# Patient Record
Sex: MALE
[De-identification: names, ages, dates, MRNs, and addresses within clinical notes are randomized per-mention and may not be internally consistent; named-entity substitution may affect disease eponyms.]

---

## 2018-01-03 ENCOUNTER — HOSPITAL ENCOUNTER (EMERGENCY)
Dept: HOSPITAL 88 - ER | Age: 58
Discharge: HOME | End: 2018-01-03
Payer: COMMERCIAL

## 2018-01-03 VITALS — BODY MASS INDEX: 22.53 KG/M2 | HEIGHT: 64 IN | WEIGHT: 132 LBS

## 2018-01-03 DIAGNOSIS — J45.909: ICD-10-CM

## 2018-01-03 DIAGNOSIS — J20.9: ICD-10-CM

## 2018-01-03 DIAGNOSIS — R05: Primary | ICD-10-CM

## 2018-01-03 LAB
FLUAV + FLUBV AG SPEC IF: NEGATIVE
S PYO AG THROAT QL: NEGATIVE

## 2018-01-03 PROCEDURE — 71020: CPT

## 2018-01-03 PROCEDURE — 83518 IMMUNOASSAY DIPSTICK: CPT

## 2018-01-03 PROCEDURE — 87070 CULTURE OTHR SPECIMN AEROBIC: CPT

## 2018-01-03 PROCEDURE — 99283 EMERGENCY DEPT VISIT LOW MDM: CPT

## 2018-01-03 PROCEDURE — 87400 INFLUENZA A/B EACH AG IA: CPT

## 2018-01-03 PROCEDURE — 93005 ELECTROCARDIOGRAM TRACING: CPT

## 2018-01-03 NOTE — DIAGNOSTIC IMAGING REPORT
EXAMINATION:  CHEST 2 VIEWS    



INDICATION:           Cough. 



COMPARISON:  None

     

FINDINGS:

TUBES and LINES:  None.



LUNGS:  Lungs are well inflated.  Lungs are clear.   There is no evidence of

pneumonia or pulmonary edema.



PLEURA:  No pleural effusion or pneumothorax.



HEART AND MEDIASTINUM:  The cardiomediastinal silhouette is unremarkable.    



BONES AND SOFT TISSUES:  No acute osseous lesion.  Soft tissues are

unremarkable.



UPPER ABDOMEN: No free air under the diaphragm.    



IMPRESSION: 

No acute thoracic abnormality.





Signed by: Dr. Iam Paul M.D. on 1/3/2018 5:44 AM

## 2019-11-07 ENCOUNTER — HOSPITAL ENCOUNTER (EMERGENCY)
Dept: HOSPITAL 88 - ER | Age: 59
Discharge: HOME | End: 2019-11-07
Payer: COMMERCIAL

## 2019-11-07 VITALS — BODY MASS INDEX: 22.53 KG/M2 | HEIGHT: 64 IN | WEIGHT: 132 LBS

## 2019-11-07 DIAGNOSIS — S00.12XA: ICD-10-CM

## 2019-11-07 DIAGNOSIS — I16.0: ICD-10-CM
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
## 2019-11-07 DIAGNOSIS — S05.02XA: Primary | ICD-10-CM
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
## 2019-11-07 PROCEDURE — 99283 EMERGENCY DEPT VISIT LOW MDM: CPT

## 2019-11-07 NOTE — XMS REPORT
Patient Summary Document

                             Created on: 2019



JEYSON MARTINI

External Reference #: [id number]

: 1960

Sex: Male



Demographics







                          Address                   [address] [place], [place] [id number]

 

                          Home Phone                [phone]

 

                          Preferred Language        Unknown

 

                          Marital Status            Unknown

 

                          [place] Affiliation     Unknown

 

                          Race                      Unknown

 

                                        Additional Race(s)  

 

                          Ethnic Group              Unknown





Author







                          Author                    [facility]

 

                          Address                   Unknown

 

                          Phone                     Unavailable







Care Team Providers







                    Care Team Member Name    Role                Phone

 

                    SWEET, A LAIRD      Unavailable         Unavailable







Problems

This patient has no known problems.



Allergies, Adverse Reactions, Alerts

This patient has no known allergies or adverse reactions.



Medications

This patient has no known medications.



Results







           Test Description    Test Time    Test Comments    Text Results    Atomic Results    Result

 Comments

 

                CHEST 2 VIEWS                                        [place] [address]      Patient Name: JEYSON MARTINI   MR 
#: [id number]    : 1960 Age/Sex: 57/M  Acct #: [id number] Req #: [id number]
[id number]  Adm Physician:     Ordered by: ILDEFONSO SNYDER MD  Report #: [id number]  
Location: ER  Room/Bed:     
____________________________________________________________________________
_______________________    Procedure: [id number] DX/CHEST 2 VIEWS  Exam Date: 
18                            Exam Time: 0400       REPORT STATUS: Signed 
  EXAMINATION:  CHEST 2 VIEWS          INDICATION:           Cough.       
COMPARISON:  None           FINDINGS:   TUBES and LINES:  None.      LUNGS:  
Lungs are well inflated.  Lungs are clear.   There is no evidence of   pneumonia
or pulmonary edema.      PLEURA:  No pleural effusion or pneumothorax.      
HEART AND MEDIASTINUM:  The cardiomediastinal silhouette is unremarkable.       
  BONES AND SOFT TISSUES:  No acute osseous lesion.  Soft tissues are   
unremarkable.      UPPER ABDOMEN: No free air under the diaphragm.          
IMPRESSION:    No acute thoracic abnormality.         Signed by: Dr. Iam Paul M.D. on 1/3/2018 5:44 AM        Dictated By: IAM BELTRAN MD  
Electronically Signed By: IAM BELTRAN MD on 18  Transcribed 
By: MAKSIM on 18       COPY TO:   ILDEFONSO SNYDER MD

## 2020-03-10 ENCOUNTER — HOSPITAL ENCOUNTER (EMERGENCY)
Dept: HOSPITAL 88 - ER | Age: 60
Discharge: HOME | End: 2020-03-10
Payer: COMMERCIAL

## 2020-03-10 VITALS — HEIGHT: 64 IN | WEIGHT: 132 LBS | BODY MASS INDEX: 22.53 KG/M2

## 2020-03-10 DIAGNOSIS — R31.29: Primary | ICD-10-CM

## 2020-03-10 DIAGNOSIS — J45.909: ICD-10-CM

## 2020-03-10 DIAGNOSIS — I10: ICD-10-CM

## 2020-03-10 LAB
ALBUMIN SERPL-MCNC: 4.4 G/DL (ref 3.5–5)
ALBUMIN/GLOB SERPL: 1.4 {RATIO} (ref 0.8–2)
ALP SERPL-CCNC: 72 IU/L (ref 40–150)
ALT SERPL-CCNC: 28 IU/L (ref 0–55)
ANION GAP SERPL CALC-SCNC: 8.6 MMOL/L (ref 8–16)
BACTERIA URNS QL MICRO: (no result) /HPF
BASOPHILS # BLD AUTO: 0 10*3/UL (ref 0–0.1)
BASOPHILS NFR BLD AUTO: 0.2 % (ref 0–1)
BILIRUB UR QL: NEGATIVE
BUN SERPL-MCNC: 15 MG/DL (ref 7–26)
BUN/CREAT SERPL: 15 (ref 6–25)
CALCIUM SERPL-MCNC: 9.1 MG/DL (ref 8.4–10.2)
CHLORIDE SERPL-SCNC: 106 MMOL/L (ref 98–107)
CK MB SERPL-MCNC: 1.6 NG/ML (ref 0–5)
CK SERPL-CCNC: 254 IU/L (ref 30–200)
CLARITY UR: (no result)
CO2 SERPL-SCNC: 27 MMOL/L (ref 22–29)
COLOR UR: (no result)
DEPRECATED APTT PLAS QN: 29.4 SECONDS (ref 23.8–35.5)
DEPRECATED INR PLAS: 0.94
DEPRECATED NEUTROPHILS # BLD AUTO: 6.6 10*3/UL (ref 2.1–6.9)
DEPRECATED RBC URNS MANUAL-ACNC: (no result) /HPF (ref 0–5)
EGFRCR SERPLBLD CKD-EPI 2021: > 60 ML/MIN (ref 60–?)
EOSINOPHIL # BLD AUTO: 0 10*3/UL (ref 0–0.4)
EOSINOPHIL NFR BLD AUTO: 0.4 % (ref 0–6)
EPI CELLS URNS QL MICRO: (no result) /LPF
ERYTHROCYTE [DISTWIDTH] IN CORD BLOOD: 12.7 % (ref 11.7–14.4)
GLOBULIN PLAS-MCNC: 3.1 G/DL (ref 2.3–3.5)
GLUCOSE SERPLBLD-MCNC: 115 MG/DL (ref 74–118)
HCT VFR BLD AUTO: 42 % (ref 38.2–49.6)
HGB BLD-MCNC: 14.4 G/DL (ref 14–18)
KETONES UR QL STRIP.AUTO: NEGATIVE
LEUKOCYTE ESTERASE UR QL STRIP.AUTO: NEGATIVE
LYMPHOCYTES # BLD: 1 10*3/UL (ref 1–3.2)
LYMPHOCYTES NFR BLD AUTO: 12.5 % (ref 18–39.1)
MCH RBC QN AUTO: 29.9 PG (ref 28–32)
MCHC RBC AUTO-ENTMCNC: 34.3 G/DL (ref 31–35)
MCV RBC AUTO: 87.1 FL (ref 81–99)
MONOCYTES # BLD AUTO: 0.5 10*3/UL (ref 0.2–0.8)
MONOCYTES NFR BLD AUTO: 5.7 % (ref 4.4–11.3)
NEUTS SEG NFR BLD AUTO: 80.8 % (ref 38.7–80)
NITRITE UR QL STRIP.AUTO: NEGATIVE
PLATELET # BLD AUTO: 120 X10E3/UL (ref 140–360)
POTASSIUM SERPL-SCNC: 3.6 MMOL/L (ref 3.5–5.1)
PROT UR QL STRIP.AUTO: NEGATIVE
PROTHROMBIN TIME: 13.1 SECONDS (ref 11.9–14.5)
RBC # BLD AUTO: 4.82 X10E6/UL (ref 4.3–5.7)
RBC CASTS #/AREA URNS LPF: (no result) /[LPF]
SODIUM SERPL-SCNC: 138 MMOL/L (ref 136–145)
SP GR UR STRIP: 1.02 (ref 1.01–1.02)
UROBILINOGEN UR STRIP-MCNC: 0.2 MG/DL (ref 0.2–1)
WBC #/AREA URNS HPF: (no result) /HPF (ref 0–5)

## 2020-03-10 PROCEDURE — 81001 URINALYSIS AUTO W/SCOPE: CPT

## 2020-03-10 PROCEDURE — 85610 PROTHROMBIN TIME: CPT

## 2020-03-10 PROCEDURE — 82553 CREATINE MB FRACTION: CPT

## 2020-03-10 PROCEDURE — 85730 THROMBOPLASTIN TIME PARTIAL: CPT

## 2020-03-10 PROCEDURE — 80053 COMPREHEN METABOLIC PANEL: CPT

## 2020-03-10 PROCEDURE — 82550 ASSAY OF CK (CPK): CPT

## 2020-03-10 PROCEDURE — 85025 COMPLETE CBC W/AUTO DIFF WBC: CPT

## 2020-03-10 PROCEDURE — 36415 COLL VENOUS BLD VENIPUNCTURE: CPT

## 2020-03-10 PROCEDURE — 84484 ASSAY OF TROPONIN QUANT: CPT

## 2020-03-29 ENCOUNTER — HOSPITAL ENCOUNTER (EMERGENCY)
Dept: HOSPITAL 88 - ER | Age: 60
Discharge: HOME | End: 2020-03-29
Payer: COMMERCIAL

## 2020-03-29 VITALS — HEIGHT: 64 IN | BODY MASS INDEX: 22.53 KG/M2 | WEIGHT: 132 LBS

## 2020-03-29 VITALS — SYSTOLIC BLOOD PRESSURE: 155 MMHG | DIASTOLIC BLOOD PRESSURE: 88 MMHG

## 2020-03-29 DIAGNOSIS — N13.2: Primary | ICD-10-CM

## 2020-03-29 PROCEDURE — 74176 CT ABD & PELVIS W/O CONTRAST: CPT

## 2020-03-29 PROCEDURE — 99283 EMERGENCY DEPT VISIT LOW MDM: CPT

## 2020-03-29 NOTE — DIAGNOSTIC IMAGING REPORT
EXAM: CT Abdomen and Pelvis WITHOUT contrast  

INDICATION: Left flank pain .      

COMPARISON: None.

TECHNIQUE: Abdomen and pelvis were scanned utilizing a multidetector helical

scanner from the lung base to the pubic symphysis without administration of IV

contrast. Absence of intravenous contrast decreases sensitivity for detection

of focal lesions and vascular pathology. Coronal and sagittal reformations were

obtained. Routine protocol was performed. 

     IV CONTRAST: None

     ORAL CONTRAST: None

            

COMPLICATIONS: None



RADIATION DOSE:

     Total DLP: 206 mGy*cm

     Estimated effective dose: (DLP x 0.015 x size factor) mSv

     CTDIvol has been reviewed. It is below the limits set by the Radiation

Protocol Committee (RPC).

     Dose modulation, iterative reconstruction, and/or weight based adjustment

of the mA/kV was utilized to reduce the radiation dose to as low as reasonably

achievable. 



FINDINGS:



LINES and TUBES: None.



LOWER THORAX:  Unremarkable



HEPATOBILIARY:      A few scattered small too small to characterize

hypodensities , likely benign. No biliary ductal dilation. 



GALLBLADDER: No radio-opaque stones or sludge.  No wall thickening.



SPLEEN: No splenomegaly. 



PANCREAS: No focal masses or ductal dilatation.  



ADRENALS: No adrenal nodules    



KIDNEYS/URETERS: A 3 mm obstructive calculus in the distal left ureter with

mild upstream left hydroureteronephrosis. No hydronephrosis. No cystic or solid

mass lesions.  No stones.



GI TRACT: No abnormal distention, wall thickening, or evidence of bowel

obstruction. Small third segment duodenal diverticulum. Appendix is normal.



PELVIC ORGANS/BLADDER:  Moderate prostatomegaly with subtle internal

calcifications.



LYMPH NODES: No lymphadenopathy.



VESSELS: Minimal arterial calcifications.    



PERITONEUM / RETROPERITONEUM: There are 2 tiny calcifications in the lower

peritoneum, likely chronically torsed , avulsed, calcified epiploic appendices.



BONES: Degenerative changes in the spine. 



SOFT TISSUES: There is a fat containing left inguinal hernia.            



IMPRESSION: 



A 3 mm obstructive calculus in the distal left ureter with mild upstream left

hydroureteronephrosis.



Moderate prostatomegaly.



Signed by: Ric Zhao DO on 3/29/2020 9:03 PM

## 2020-09-12 ENCOUNTER — HOSPITAL ENCOUNTER (INPATIENT)
Dept: HOSPITAL 88 - ER | Age: 60
LOS: 5 days | Discharge: HOME | DRG: 872 | End: 2020-09-17
Attending: INTERNAL MEDICINE | Admitting: INTERNAL MEDICINE
Payer: COMMERCIAL

## 2020-09-12 VITALS — HEIGHT: 64 IN | WEIGHT: 132 LBS | BODY MASS INDEX: 22.53 KG/M2

## 2020-09-12 VITALS — DIASTOLIC BLOOD PRESSURE: 85 MMHG | SYSTOLIC BLOOD PRESSURE: 122 MMHG

## 2020-09-12 DIAGNOSIS — R19.7: ICD-10-CM

## 2020-09-12 DIAGNOSIS — Z11.59: ICD-10-CM

## 2020-09-12 DIAGNOSIS — A41.51: Primary | ICD-10-CM

## 2020-09-12 DIAGNOSIS — J45.909: ICD-10-CM

## 2020-09-12 DIAGNOSIS — E87.6: ICD-10-CM

## 2020-09-12 DIAGNOSIS — K25.9: ICD-10-CM

## 2020-09-12 DIAGNOSIS — Z16.24: ICD-10-CM

## 2020-09-12 DIAGNOSIS — K40.90: ICD-10-CM

## 2020-09-12 DIAGNOSIS — Z16.12: ICD-10-CM

## 2020-09-12 DIAGNOSIS — C61: ICD-10-CM

## 2020-09-12 DIAGNOSIS — I10: ICD-10-CM

## 2020-09-12 DIAGNOSIS — N12: ICD-10-CM

## 2020-09-12 LAB
ALBUMIN SERPL-MCNC: 4.3 G/DL (ref 3.5–5)
ALBUMIN/GLOB SERPL: 1.6 {RATIO} (ref 0.8–2)
ALP SERPL-CCNC: 67 IU/L (ref 40–150)
ALT SERPL-CCNC: 27 IU/L (ref 0–55)
ANION GAP SERPL CALC-SCNC: 17.4 MMOL/L (ref 8–16)
BACTERIA URNS QL MICRO: (no result) /HPF
BASOPHILS # BLD AUTO: 0 10*3/UL (ref 0–0.1)
BASOPHILS NFR BLD AUTO: 0 % (ref 0–1)
BILIRUB UR QL: NEGATIVE
BUN SERPL-MCNC: 14 MG/DL (ref 7–26)
BUN/CREAT SERPL: 15 (ref 6–25)
CALCIUM SERPL-MCNC: 8.1 MG/DL (ref 8.4–10.2)
CHLORIDE SERPL-SCNC: 104 MMOL/L (ref 98–107)
CLARITY UR: (no result)
CO2 SERPL-SCNC: 18 MMOL/L (ref 22–29)
COLOR UR: (no result)
DEPRECATED NEUTROPHILS # BLD AUTO: 3.9 10*3/UL (ref 2.1–6.9)
DEPRECATED RBC URNS MANUAL-ACNC: >50 /HPF (ref 0–5)
EGFRCR SERPLBLD CKD-EPI 2021: > 60 ML/MIN (ref 60–?)
EOSINOPHIL # BLD AUTO: 0 10*3/UL (ref 0–0.4)
EOSINOPHIL NFR BLD AUTO: 0.2 % (ref 0–6)
EPI CELLS URNS QL MICRO: (no result) /LPF
ERYTHROCYTE [DISTWIDTH] IN CORD BLOOD: 12.5 % (ref 11.7–14.4)
GLOBULIN PLAS-MCNC: 2.7 G/DL (ref 2.3–3.5)
GLUCOSE SERPLBLD-MCNC: 129 MG/DL (ref 74–118)
HCT VFR BLD AUTO: 41.3 % (ref 38.2–49.6)
HGB BLD-MCNC: 14.3 G/DL (ref 14–18)
KETONES UR QL STRIP.AUTO: NEGATIVE
LEUKOCYTE ESTERASE UR QL STRIP.AUTO: NEGATIVE
LYMPHOCYTES # BLD: 0.4 10*3/UL (ref 1–3.2)
LYMPHOCYTES NFR BLD AUTO: 10.1 % (ref 18–39.1)
MCH RBC QN AUTO: 29.9 PG (ref 28–32)
MCHC RBC AUTO-ENTMCNC: 34.6 G/DL (ref 31–35)
MCV RBC AUTO: 86.4 FL (ref 81–99)
MONOCYTES # BLD AUTO: 0 10*3/UL (ref 0.2–0.8)
MONOCYTES NFR BLD AUTO: 0.5 % (ref 4.4–11.3)
NEUTS SEG NFR BLD AUTO: 89 % (ref 38.7–80)
NITRITE UR QL STRIP.AUTO: NEGATIVE
PLATELET # BLD AUTO: 96 X10E3/UL (ref 140–360)
POTASSIUM SERPL-SCNC: 3.4 MMOL/L (ref 3.5–5.1)
PROT UR QL STRIP.AUTO: (no result)
RBC # BLD AUTO: 4.78 X10E6/UL (ref 4.3–5.7)
SODIUM SERPL-SCNC: 136 MMOL/L (ref 136–145)
SP GR UR STRIP: 1.01 (ref 1.01–1.02)
UROBILINOGEN UR STRIP-MCNC: 0.2 MG/DL (ref 0.2–1)
WBC #/AREA URNS HPF: (no result) /HPF (ref 0–5)

## 2020-09-12 PROCEDURE — 71045 X-RAY EXAM CHEST 1 VIEW: CPT

## 2020-09-12 PROCEDURE — 87205 SMEAR GRAM STAIN: CPT

## 2020-09-12 PROCEDURE — 82550 ASSAY OF CK (CPK): CPT

## 2020-09-12 PROCEDURE — 02HV33Z INSERTION OF INFUSION DEVICE INTO SUPERIOR VENA CAVA, PERCUTANEOUS APPROACH: ICD-10-PCS

## 2020-09-12 PROCEDURE — 83605 ASSAY OF LACTIC ACID: CPT

## 2020-09-12 PROCEDURE — 85025 COMPLETE CBC W/AUTO DIFF WBC: CPT

## 2020-09-12 PROCEDURE — 36569 INSJ PICC 5 YR+ W/O IMAGING: CPT

## 2020-09-12 PROCEDURE — 87040 BLOOD CULTURE FOR BACTERIA: CPT

## 2020-09-12 PROCEDURE — 87493 C DIFF AMPLIFIED PROBE: CPT

## 2020-09-12 PROCEDURE — 80048 BASIC METABOLIC PNL TOTAL CA: CPT

## 2020-09-12 PROCEDURE — 80053 COMPREHEN METABOLIC PANEL: CPT

## 2020-09-12 PROCEDURE — 82270 OCCULT BLOOD FECES: CPT

## 2020-09-12 PROCEDURE — 36415 COLL VENOUS BLD VENIPUNCTURE: CPT

## 2020-09-12 PROCEDURE — 99284 EMERGENCY DEPT VISIT MOD MDM: CPT

## 2020-09-12 PROCEDURE — 93005 ELECTROCARDIOGRAM TRACING: CPT

## 2020-09-12 PROCEDURE — 82553 CREATINE MB FRACTION: CPT

## 2020-09-12 PROCEDURE — 84484 ASSAY OF TROPONIN QUANT: CPT

## 2020-09-12 PROCEDURE — 87071 CULTURE AEROBIC QUANT OTHER: CPT

## 2020-09-12 PROCEDURE — 87186 SC STD MICRODIL/AGAR DIL: CPT

## 2020-09-12 PROCEDURE — 81001 URINALYSIS AUTO W/SCOPE: CPT

## 2020-09-12 RX ADMIN — SODIUM CHLORIDE SCH MLS/HR: 9 INJECTION, SOLUTION INTRAVENOUS at 22:53

## 2020-09-12 NOTE — DIAGNOSTIC IMAGING REPORT
EXAMINATION:  CHEST SINGLE (PORTABLE)    



INDICATION:      Fever



COMPARISON:  None

     

FINDINGS:    



TUBES and LINES:  None.



LUNGS:  Normal lung volumes.  Lungs are clear.  No consolidations.



PLEURA:  No pleural effusion or pneumothorax.



HEART AND MEDIASTINUM:  The cardiomediastinal silhouette is unremarkable.    



BONES AND SOFT TISSUES:  No acute osseous lesion.  Soft tissues are

unremarkable.



UPPER ABDOMEN: No free air under the diaphragm.    



IMPRESSION: 



No acute thoracic radiographic abnormality.





Signed by: Ric Zhao DO on 9/12/2020 8:19 PM

## 2020-09-12 NOTE — XMS REPORT
Continuity of Care Document

                             Created on: 2020



JEYSON MARTINI

External Reference #: [id number]

: 1960

Sex: Male



Demographics





                          Address                   [address] [place] [address]  [address]

 

                          Home Phone                [phone]

 

                          Preferred Language        Unknown

 

                          Marital Status            Unknown

 

                          Sikhism Affiliation     Unknown

 

                          Race                      Unknown

 

                          Additional Race(s)        Other



 

                          Ethnic Group              Unknown





Author





                          Author                    [facility]

t

 

                          Organization              [facility]

 

                          Address                   [address]

[place]  [address]



 

                          Phone                     Unavailable







Care Team Providers





                    Care Team Member Name Role                Phone

 

                    RASHIDA ARNETT    PCP                 [phone]

 

                    PAULETTE ACEVES      Attphys             Unavailable

 

                    SWEET, RUDDY FREGOSO      Attphys             Unavailable







Payers





           Payer Name Policy Type Policy Number Effective Date Expiration Date S

sonya

 

           [facility]            [id number] 2014 00:00:00     

       [facility]







Problems

This patient has no known problems.



Allergies, Adverse Reactions, Alerts

This patient has no known allergies or adverse reactions.



Medications

This patient has no known medications.



Procedures





                Procedure       Date / Time Performed Performing Clinician [facility]

e

 

                CT of abdomen and pelvis without contrast 2020 00:00:00 PAULETTE FUENTES   [facility]

 

                EMERGENCY DEPT VISIT 2020-03-10 00:00:00                 [facility]







Encounters





             Start Date/Time End Date/Time Encounter Type Admission Type Attendi

[facility]   Care Department Encounter ID    Source

 

           2020 18:55:00 2020 22:26:00 Departed Emergency Room 1    

      KETAN PAULETTE 

[facility]              [id number]        [facility]

 

          2020-03-10 17:30:00 2020-03-10 19:30:00 Departed Emergency Room       

              [facility]                    [id number]              [facility]

 

          2019 20:18:00 2019 21:32:00 Departed Emergency Room       

              [facility]                    [id number]              [facility]







Results





           Test Description Test Time  Test Comments Results    Result Comments 

Source

 

                CT ABDOMEN/PELVIS WO 2020 20:45:00                        

                              

                                                [facility]                        [address] [address]      Patient Name: JEYSON MARTINI                                
  MR #: [id number]                     : 1960                          
        Age/Sex: 59/M  Acct #: [id number]                              Req #: 
[id number]  Adm Physician:                                                      
Ordered by: PAULETTE ACEVES DO                            Report #: [id number]     
  Location: ER                                      Room/Bed:                   
 
________________________________________________________________________________

___________________    Procedure: [id number] CT/CT ABDOMEN/PELVIS WO  Exam Date: 
20                            Exam Time:                              
                REPORT STATUS: Signed    EXAM: CT Abdomen and Pelvis WITHOUT 
contrast     INDICATION: Left flank pain .         COMPARISON: None.   
TECHNIQUE: Abdomen and pelvis were scanned utilizing a multidetector helical   
scanner from the lung base to the pubic symphysis without administration of IV  
contrast. Absence of intravenous contrast decreases sensitivity for detection   
of focal lesions and vascular pathology. Coronal and sagittal reformations were 
 obtained. Routine protocol was performed.         IV CONTRAST: None        ORAL
CONTRAST: None                  COMPLICATIONS: None      RADIATION DOSE:        
Total DLP: 206 mGy*cm        Estimated effective dose: (DLP x 0.015 x size 
factor) mSv        CTDIvol has been reviewed. It is below the limits set by the 
Radiation   Protocol Committee (RPC).        Dose modulation, iterative 
reconstruction, and/or weight based adjustment   of the mA/kV was utilized to 
reduce the radiation dose to as low as reasonably   achievable.       FINDINGS: 
    LINES and TUBES: None.      LOWER THORAX:  Unremarkable      HEPATOBILIARY: 
    A few scattered small too small to characterize   hypodensities , likely 
benign. No biliary ductal dilation.       GALLBLADDER: No radio-opaque stones or
sludge.  No wall thickening.      SPLEEN: No splenomegaly.       PANCREAS: No 
focal masses or ductal dilatation.        ADRENALS: No adrenal nodules          
KIDNEYS/URETERS: A 3 mm obstructive calculus in the distal left ureter with   
mild upstream left hydroureteronephrosis. No hydronephrosis. No cystic or solid 
 mass lesions.  No stones.      GI TRACT: No abnormal distention, wall 
thickening, or evidence of bowel   obstruction. Small third segment duodenal 
diverticulum. Appendix is normal.      PELVIC ORGANS/BLADDER:  Moderate 
prostatomegaly with subtle internal   calcifications.      LYMPH NODES: No 
lymphadenopathy.      VESSELS: Minimal arterial calcifications.          
PERITONEUM / RETROPERITONEUM: There are 2 tiny calcifications in the lower   
peritoneum, likely chronically torsed , avulsed, calcified epiploic appendices. 
    BONES: Degenerative changes in the spine.       SOFT TISSUES: There is a fat
containing left inguinal hernia.                  IMPRESSION:       A 3 mm 
obstructive calculus in the distal left ureter with mild upstream left   
hydroureteronephrosis.      Moderate prostatomegaly.      Signed by: Ric Zhao DO on 3/29/2020 9:03 PM        Dictated By: RIC ZHAO DO  
Electronically Signed By: RIC ZHAO DO on 20  Transcribed By: 
MAKSIM on 20       COPY TO:   PAULETTE ACEVES DO                       

              

 

                    Urine Color         2020-03-10 19:37:00   

 

                                        Test Item

 

             Urine Color (test code = 5778-6) RED          YELLOW       H       

      





[facility] Texas[facility] [id number] 19:37:00* 



             Test Item    Value        Reference Range Interpretation Comments

 

             Urine Clarity (test code = 94377-6) SL CLOUDY    CLEAR        H    

         





[facility] Specific Gravity2020-03-10 19:37:00
  * 



             Test Item    Value        Reference Range Interpretation Comments

 

             Urine Specific Gravity (test code = 5811-5) 1.020        1.010-1.02

5                





[facility] pH2020-03-10 19:37:00* 



             Test Item    Value        Reference Range Interpretation Comments

 

             Urine pH (test code = 48077-2) 6            5-7                    

    





[facility] Leukocyte Esterase2020-03-10 
19:37:00* 



             Test Item    Value        Reference Range Interpretation Comments

 

             Urine Leukocyte Esterase (test code = 5799-2) NEGATIVE     NEGATIVE

                   





[facility] Nitrite2020-03-10 19:37:00* 



             Test Item    Value        Reference Range Interpretation Comments

 

             Urine Nitrite (test code = 29590-4) NEGATIVE     NEGATIVE          

         





[facility] Protein2020-03-10 19:37:00* 



             Test Item    Value        Reference Range Interpretation Comments

 

             Urine Protein (test code = 5804-0) NEGATIVE     NEGATIVE           

        





[facility] Glucose (UA)2020-03-10 19:37:00* 



             Test Item    Value        Reference Range Interpretation Comments

 

             Urine Glucose (UA) (test code = 2349-9) NEGATIVE     NEGATIVE      

             





[facility] Ketones2020-03-10 19:37:00* 



             Test Item    Value        Reference Range Interpretation Comments

 

             Urine Ketones (test code = 79767-2) NEGATIVE     NEGATIVE          

         





[facility] Urobilinogen2020-03-10 19:37:00* 



             Test Item    Value        Reference Range Interpretation Comments

 

             Urine Urobilinogen (test code = 54734-2) 0.2          0.2-1        

              





[facility] Bilirubin2020-03-10 19:37:00* 



             Test Item    Value        Reference Range Interpretation Comments

 

             Urine Bilirubin (test code = 1978-6) NEGATIVE     NEGATIVE         

          





[facility] Blood2020-03-10 19:37:00* 



             Test Item    Value        Reference Range Interpretation Comments

 

             Urine Blood (test code = 83130-0) MODERATE     NEGATIVE            

       





[facility] WBC2020-03-10 19:37:00* 



             Test Item    Value        Reference Range Interpretation Comments

 

             Urine WBC (test code = 5821-4) NONE         0-5                    

    





[facility] RBC2020-03-10 19:37:00* 



             Test Item    Value        Reference Range Interpretation Comments

 

             Urine RBC (test code = 52848-4) 21-50        0-5          H        

     





[facility] Bacteria2020-03-10 19:37:00* 



             Test Item    Value        Reference Range Interpretation Comments

 

             Urine Bacteria (test code = 71700-7) NONE         NONE             

          





[facility] Epithelial Cells2020-03-10 19:37:00
  * 



             Test Item    Value        Reference Range Interpretation Comments

 

             Urine Epithelial Cells (test code = 00139-6) RARE         NONE     

                  





[facility] Red Blood Cell Casts2020-03-10 
19:37:00* 



             Test Item    Value        Reference Range Interpretation Comments

 

             Urine Red Blood Cell Casts (test code = 43381-9) 1-5          >0   

        H             





[facility] Color2020-03-10 19:37:00* 



             Test Item    Value        Reference Range Interpretation Comments

 

             Urine Color (test code = 5778-6) RED          YELLOW       H       

      





[facility] Texas[facility] [id number] 19:37:00* 



             Test Item    Value        Reference Range Interpretation Comments

 

             Urine Clarity (test code = 57026-5) SL CLOUDY    CLEAR        H    

         





[facility] Specific Gravity2020-03-10 19:37:00
  * 



             Test Item    Value        Reference Range Interpretation Comments

 

             Urine Specific Gravity (test code = 5811-5) 1.020        1.010-1.02

5                





[facility] pH2020-03-10 19:37:00* 



             Test Item    Value        Reference Range Interpretation Comments

 

             Urine pH (test code = 93607-9) 6            5-7                    

    





[facility] Leukocyte Esterase2020-03-10 
19:37:00* 



             Test Item    Value        Reference Range Interpretation Comments

 

             Urine Leukocyte Esterase (test code = 5799-2) NEGATIVE     NEGATIVE

                   





[facility] Nitrite2020-03-10 19:37:00* 



             Test Item    Value        Reference Range Interpretation Comments

 

             Urine Nitrite (test code = 71428-9) NEGATIVE     NEGATIVE          

         





[facility] Protein2020-03-10 19:37:00* 



             Test Item    Value        Reference Range Interpretation Comments

 

             Urine Protein (test code = 5804-0) NEGATIVE     NEGATIVE           

        





[facility] Glucose (UA)2020-03-10 19:37:00* 



             Test Item    Value        Reference Range Interpretation Comments

 

             Urine Glucose (UA) (test code = 2349-9) NEGATIVE     NEGATIVE      

             





[facility] Ketones2020-03-10 19:37:00* 



             Test Item    Value        Reference Range Interpretation Comments

 

             Urine Ketones (test code = 35350-9) NEGATIVE     NEGATIVE          

         





[facility] Urobilinogen2020-03-10 19:37:00* 



             Test Item    Value        Reference Range Interpretation Comments

 

             Urine Urobilinogen (test code = 56683-3) 0.2          0.2-1        

              





[facility] Bilirubin2020-03-10 19:37:00* 



             Test Item    Value        Reference Range Interpretation Comments

 

             Urine Bilirubin (test code = 1978-6) NEGATIVE     NEGATIVE         

          





[facility] Blood2020-03-10 19:37:00* 



             Test Item    Value        Reference Range Interpretation Comments

 

             Urine Blood (test code = 55060-4) MODERATE     NEGATIVE            

       





[facility] WBC2020-03-10 19:37:00* 



             Test Item    Value        Reference Range Interpretation Comments

 

             Urine WBC (test code = 5821-4) NONE         0-5                    

    





[facility] RBC2020-03-10 19:37:00* 



             Test Item    Value        Reference Range Interpretation Comments

 

             Urine RBC (test code = 41014-9) 21-50        0-5          H        

     





[facility] Bacteria2020-03-10 19:37:00* 



             Test Item    Value        Reference Range Interpretation Comments

 

             Urine Bacteria (test code = 46216-1) NONE         NONE             

          





[facility] Epithelial Cells2020-03-10 19:37:00
  * 



             Test Item    Value        Reference Range Interpretation Comments

 

             Urine Epithelial Cells (test code = 85658-1) RARE         NONE     

                  





[facility] Red Blood Cell Casts2020-03-10 
19:37:00* 



             Test Item    Value        Reference Range Interpretation Comments

 

             Urine Red Blood Cell Casts (test code = 66649-1) 1-5          >0   

        H             





[facility] 



             Test Item    Value        Reference Range Interpretation Comments

 

             Sodium Level (test code = 2951-2) 138          136-145             

       





[facility] Level2020-03-10 19:34:00* 



             Test Item    Value        Reference Range Interpretation Comments

 

             Potassium Level (test code = 2823-3) 3.6          3.5-5.1          

          





[facility] Texas[facility]loride Level2020-03-10 19:34:00* 



             Test Item    Value        Reference Range Interpretation Comments

 

             Chloride Level (test code = 2075-0) 106                      

         





[facility] Texas[facility]bon Dioxide Level2020-03-10 19:34:00* 



             Test Item    Value        Reference Range Interpretation Comments

 

             Carbon Dioxide Level (test code = 2028-9) 27           22-29       

               





[facility] Texas[facility]ion Gap2020-03-10 19:34:00* 



             Test Item    Value        Reference Range Interpretation Comments

 

             Anion Gap (test code = 33037-3) 8.6          8-16                  

     





[facility] Texas[facility] Urea Nitrogen2020-03-10 19:34:00* 



             Test Item    Value        Reference Range Interpretation Comments

 

             Blood Urea Nitrogen (test code = 3094-0) 15           7-26         

              





[facility] Texas[facility]inine2020-03-10 19:34:00* 



             Test Item    Value        Reference Range Interpretation Comments

 

             Creatinine (test code = 2160-0) 1.02         0.72-1.25             

     





[facility] TexasBUN/Creatinine Ratio2020-03-10 19:34:00* 



             Test Item    Value        Reference Range Interpretation Comments

 

             BUN/Creatinine Ratio (test code = 3097-3) 15           -        

               





[facility] TexasEstimat Glomerular Filtration Rate
2020-03-10 19:34:00* 



             Test Item    Value        Reference Range Interpretation Comments

 

             Estimat Glomerular Filtration Rate (test code = 298742719) > 60    

     >60                        





Ranges were taken from the National Kidney Disease Education Program and the [facility] [employer] literature.Reference ranges:60 or greater: Nuvuxi29-08 (
for 3 consecutive months): Chronic kidney disease 15 or less: Kidney failureD[facility] at [facility] University of TexasGlucose Level2020-03-10 19:34:00* 



             Test Item    Value        Reference Range Interpretation Comments

 

             Glucose Level (test code = PFF3732) 115                      

         





[facility] TexasCalcium Level2020-03-10 19:34:00* 



             Test Item    Value        Reference Range Interpretation Comments

 

             Calcium Level (test code = 13519-7) 9.1          8.4-10.2          

         





[facility] TexasTotal Bilirubin2020-03-10 19:34:00* 



             Test Item    Value        Reference Range Interpretation Comments

 

             Total Bilirubin (test code = 1975-2) 0.6          0.2-1.2          

          





[facility] TexasAspartate Amino Transf (AST/SGOT)
2020-03-10 19:34:00* 



             Test Item    Value        Reference Range Interpretation Comments

 

                                        Aspartate Amino Transf (AST/SGOT) (test 

code = Aspartate Amino Transf 

(AST/SGOT))     22              5-34                             





[facility] TexasAlanine Aminotransferase (ALT/SGPT)
2020-03-10 19:34:00* 



             Test Item    Value        Reference Range Interpretation Comments

 

             Alanine Aminotransferase (ALT/SGPT) (test code = 1742-6) 28        

   0-55                       





[facility] TexasTotal Protein2020-03-10 19:34:00* 



             Test Item    Value        Reference Range Interpretation Comments

 

             Total Protein (test code = 2885-2) 7.5          6.5-8.1            

        





[facility] TexasAlbumin2020-03-10 19:34:00* 



             Test Item    Value        Reference Range Interpretation Comments

 

             Albumin (test code = 1751-7) 4.4          3.5-5.0                  

  





[facility] TexasGlobulin2020-03-10 19:34:00* 



             Test Item    Value        Reference Range Interpretation Comments

 

             Globulin (test code = 81477-3) 3.1          2.3-3.5                

    





[facility] TexasAlbumin/Globulin Ratio2020-03-10 19:34:00
  * 



             Test Item    Value        Reference Range Interpretation Comments

 

             Albumin/Globulin Ratio (test code = 1759-0) 1.4          0.8-2.0   

                 





[facility] TexasAlkaline Phosphatase2020-03-10 19:34:00* 



             Test Item    Value        Reference Range Interpretation Comments

 

             Alkaline Phosphatase (test code = 6768-6) 72                 

               





[facility] TexasCreatine [id number] 19:34:00* 



             Test Item    Value        Reference Range Interpretation Comments

 

             Creatine Kinase (test code = 2157-6) 254                 H   

          





[facility] TexasCreatine Kinase [id number] 19:34:00* 



             Test Item    Value        Reference Range Interpretation Comments

 

             Creatine Kinase MB (test code = 27644-4) 1.60         0-5.0        

              





[facility] TexasTroponin -03-10 19:34:00* 



             Test Item    Value        Reference Range Interpretation Comments

 

             Troponin I (test code = EDA1502) < 0.001      0-0.300              

      





[facility] 19:34:00* 



             Test Item    Value        Reference Range Interpretation Comments

 

             Sodium Level (test code = 2951-2) 138          136-145             

       





[facility] TexasPotassium Level2020-03-10 19:34:00* 



             Test Item    Value        Reference Range Interpretation Comments

 

             Potassium Level (test code = 2823-3) 3.6          3.5-5.1          

          





[facility] TexasChloride Level2020-03-10 19:34:00* 



             Test Item    Value        Reference Range Interpretation Comments

 

             Chloride Level (test code = 2075-0) 106                      

         





[facility] TexasCarbon Dioxide Level2020-03-10 19:34:00* 



             Test Item    Value        Reference Range Interpretation Comments

 

             Carbon Dioxide Level (test code = 2028-9) 27           22-29       

               





[facility] TexasAnion Gap2020-03-10 19:34:00* 



             Test Item    Value        Reference Range Interpretation Comments

 

             Anion Gap (test code = 33037-3) 8.6          8-16                  

     





[facility] TexasBlood Urea Nitrogen2020-03-10 19:34:00* 



             Test Item    Value        Reference Range Interpretation Comments

 

             Blood Urea Nitrogen (test code = 3094-0) 15           7-26         

              





[facility] TexasCreatinine2020-03-10 19:34:00* 



             Test Item    Value        Reference Range Interpretation Comments

 

             Creatinine (test code = 2160-0) 1.02         0.72-1.25             

     





[facility] TexasBUN/Creatinine Ratio2020-03-10 19:34:00* 



             Test Item    Value        Reference Range Interpretation Comments

 

             BUN/Creatinine Ratio (test code = 3097-3) 15           6-        

               





[facility] [facility] TexasEstimat Glomerular Filtration Rate
2020-03-10 19:34:00* 



             Test Item    Value        Reference Range Interpretation Comments

 

             Estimat Glomerular Filtration Rate (test code = 015912671) > 60    

     >60                        





Ranges were taken from the National Kidney Disease Education Program and the [facility] literature.Reference ranges:60 or greater: [id number] (
for 3 consecutive months): Chronic kidney disease 15 or less: Kidney failureDell Seton [facility] of TexasGlucose Level2020-03-10 19:34:00* 



             Test Item    Value        Reference Range Interpretation Comments

 

             Glucose Level (test code = BWW9183) 115                      

         





[facility] TexasCalcium Level2020-03-10 19:34:00* 



             Test Item    Value        Reference Range Interpretation Comments

 

             Calcium Level (test code = 52767-1) 9.1          8.4-10.2          

         





[facility] TexasTotal Bilirubin2020-03-10 19:34:00* 



             Test Item    Value        Reference Range Interpretation Comments

 

             Total Bilirubin (test code = 1975-2) 0.6          0.2-1.2          

          





[facility] TexasAspartate Amino Transf (AST/SGOT)
2020-03-10 19:34:00* 



             Test Item    Value        Reference Range Interpretation Comments

 

                                        Aspartate Amino Transf (AST/SGOT) (test 

code = Aspartate Amino Transf 

(AST/SGOT))     22              5-34                             





[facility] TexasAlanine Aminotransferase (ALT/SGPT)
2020-03-10 19:34:00* 



             Test Item    Value        Reference Range Interpretation Comments

 

             Alanine Aminotransferase (ALT/SGPT) (test code = 1742-6) 28        

   0-55                       





[facility] TexasTotal Protein2020-03-10 19:34:00* 



             Test Item    Value        Reference Range Interpretation Comments

 

             Total Protein (test code = 2885-2) 7.5          6.5-8.1            

        





[facility] TexasAlbumin2020-03-10 19:34:00* 



             Test Item    Value        Reference Range Interpretation Comments

 

             Albumin (test code = 1751-7) 4.4          3.5-5.0                  

  





[facility] TexasGlobulin2020-03-10 19:34:00* 



             Test Item    Value        Reference Range Interpretation Comments

 

             Globulin (test code = 27898-8) 3.1          2.3-3.5                

    





[facility] TexasAlbumin/Globulin Ratio2020-03-10 19:34:00
  * 



             Test Item    Value        Reference Range Interpretation Comments

 

             Albumin/Globulin Ratio (test code = 1759-0) 1.4          0.8-2.0   

                 





[facility] TexasAlkaline Phosphatase2020-03-10 19:34:00* 



             Test Item    Value        Reference Range Interpretation Comments

 

             Alkaline Phosphatase (test code = 6768-6) 72                 

               





[facility] TexasCreatine [id number] 19:34:00* 



             Test Item    Value        Reference Range Interpretation Comments

 

             Creatine Kinase (test code = 2157-6) 254                 H   

          





[facility] TexasCreatine Kinase [id number] 19:34:00* 



             Test Item    Value        Reference Range Interpretation Comments

 

             Creatine Kinase MB (test code = 64177-3) 1.60         0-5.0        

              





[facility] TexasTroponin -03-10 19:34:00* 



             Test Item    Value        Reference Range Interpretation Comments

 

             Troponin I (test code = AMN9778) < 0.001      0-0.300              

      





[facility] TexasProthrombin Time2020-03-10 19:23:00* 



             Test Item    Value        Reference Range Interpretation Comments

 

             Prothrombin Time (test code = 5902-2) 13.1         11.9-14.5       

           





[facility] TexasProthromb Time International Ratio
2020-03-10 19:23:00* 



             Test Item    Value        Reference Range Interpretation Comments

 

             Prothromb Time International Ratio (test code = 6301-6) 0.94       

                             





Oral Anticoagulant Therapy INR Values:1. Low Intensity Therapy        1.5 - 2.02
. Moderate Intensity Therapy   2.0 - 3.03. High Intensity Therapy(1)    2.5 - 3.
54. High Intensity Therapy(2)    3.0 - 4.05. Panic Value INR              > 5.0
[facility] TexasActivated Partial Thromboplast Time
2020-03-10 19:23:00* 



             Test Item    Value        Reference Range Interpretation Comments

 

             Activated Partial Thromboplast Time (test code = 11860-7) 29.4     

    23.8-35.5                  





[facility] TexasProthrombin Time2020-03-10 19:23:00* 



             Test Item    Value        Reference Range Interpretation Comments

 

             Prothrombin Time (test code = 5902-2) 13.1         11.9-14.5       

           





[facility] TexasProthromb Time International Ratio
2020-03-10 19:23:00* 



             Test Item    Value        Reference Range Interpretation Comments

 

             Prothromb Time International Ratio (test code = 6301-6) 0.94       

                             





Oral Anticoagulant Therapy INR Values:1. Low Intensity Therapy        1.5 - 2.02
. Moderate Intensity Therapy   2.0 - 3.03. High Intensity Therapy(1)    2.5 - 3.
54. High Intensity Therapy(2)    3.0 - 4.05. Panic Value INR              > 5.0
[facility] TexasActivated Partial Thromboplast Time
2020-03-10 19:23:00* 



             Test Item    Value        Reference Range Interpretation Comments

 

             Activated Partial Thromboplast Time (test code = 75235-5) 29.4     

    23.8-35.5                  





[facility] TexasWhite Blood Count2020-03-10 19:09:00* 



             Test Item    Value        Reference Range Interpretation Comments

 

             White Blood Count (test code = 6690-2) 8.13         4.8-10.8       

            





[facility] TexasRed Blood Count2020-03-10 19:09:00* 



             Test Item    Value        Reference Range Interpretation Comments

 

             Red Blood Count (test code = 789-8) 4.82         4.3-5.7           

         





[facility] TexasHemoglobin2020-03-10 19:09:00* 



             Test Item    Value        Reference Range Interpretation Comments

 

             Hemoglobin (test code = 83603-0) 14.4         14.0-18.0            

      





[facility] TexasHematocrit2020-03-10 19:09:00* 



             Test Item    Value        Reference Range Interpretation Comments

 

             Hematocrit (test code = 4544-3) 42.0         38.2-49.6             

     





[facility] TexasMean Corpuscular Volume2020-03-10 
19:09:00* 



             Test Item    Value        Reference Range Interpretation Comments

 

             Mean Corpuscular Volume (test code = 787-2) 87.1         81-99     

                 





[facility] [place]Mean Corpuscular Hemoglobin2020-03-10 
19:09:00* 



             Test Item    Value        Reference Range Interpretation Comments

 

             Mean Corpuscular Hemoglobin (test code = 785-6) 29.9         28-32 

                     





[facility] TexasMean Corpuscular Hemoglobin Concent
2020-03-10 19:09:00* 



             Test Item    Value        Reference Range Interpretation Comments

 

             Mean Corpuscular Hemoglobin Concent (test code = 786-4) 34.3       

  31-35                      





[facility] TexasRed Cell Distribution Width2020-03-10 
19:09:00* 



             Test Item    Value        Reference Range Interpretation Comments

 

             Red Cell Distribution Width (test code = 28396-7) 12.7         11.7

-14.4                  





[facility] TexasPlatelet Count2020-03-10 19:09:00* 



             Test Item    Value        Reference Range Interpretation Comments

 

             Platelet Count (test code = 777-3) 120          140-360      L     

        





[facility] TexasNeutrophils (%) (Auto)2020-03-10 19:09:00
  * 



             Test Item    Value        Reference Range Interpretation Comments

 

             Neutrophils (%) (Auto) (test code = 40596-3) 80.8         38.7-80.0

    H             





[facility] TexasLymphocytes (%) (Auto)2020-03-10 19:09:00
  * 



             Test Item    Value        Reference Range Interpretation Comments

 

             Lymphocytes (%) (Auto) (test code = 736-9) 12.5         18.0-39.1  

  L             





[facility] TexasMonocytes (%) (Auto)2020-03-10 19:09:00* 



             Test Item    Value        Reference Range Interpretation Comments

 

             Monocytes (%) (Auto) (test code = 5905-5) 5.7          4.4-11.3    

               





[facility] TexasEosinophils (%) (Auto)2020-03-10 19:09:00
  * 



             Test Item    Value        Reference Range Interpretation Comments

 

             Eosinophils (%) (Auto) (test code = 713-8) 0.4          0.0-6.0    

                





[facility] [place]Basophils (%) (Auto)2020-03-10 19:09:00* 



             Test Item    Value        Reference Range Interpretation Comments

 

             Basophils (%) (Auto) (test code = 706-2) 0.2          0.0-1.0      

              





[facility] TexasIM GRANULOCYTES %2020-03-10 19:09:00* 



             Test Item    Value        Reference Range Interpretation Comments

 

             IM GRANULOCYTES % (test code = IM GRANULOCYTES %) 0.4          0.0-

1.0                    





[facility] TexasNeutrophils # (Auto)2020-03-10 19:09:00* 



             Test Item    Value        Reference Range Interpretation Comments

 

             Neutrophils # (Auto) (test code = 751-8) 6.6          2.1-6.9      

              





[facility] TexasLymphocytes # (Auto)2020-03-10 19:09:00* 



             Test Item    Value        Reference Range Interpretation Comments

 

             Lymphocytes # (Auto) (test code = 46885-6) 1.0          1.0-3.2    

                





[facility] [place]Monocytes # (Auto)2020-03-10 19:09:00* 



             Test Item    Value        Reference Range Interpretation Comments

 

             Monocytes # (Auto) (test code = 742-7) 0.5          0.2-0.8        

            





[facility] TexasEosinophils # (Auto)2020-03-10 19:09:00* 



             Test Item    Value        Reference Range Interpretation Comments

 

             Eosinophils # (Auto) (test code = 711-2) 0.0          0.0-0.4      

              





[facility] TexasBasophils # (Auto)2020-03-10 19:09:00* 



             Test Item    Value        Reference Range Interpretation Comments

 

             Basophils # (Auto) (test code = 704-7) 0.0          0.0-0.1        

            





[facility] TexasAbsolute Immature Granulocyte (auto
2020-03-10 19:09:00* 



             Test Item    Value        Reference Range Interpretation Comments

 

                                        Absolute Immature Granulocyte (auto (nessa

t code = Absolute Immature Granulocyte 

(auto)          0.03            0-0.1                            





[facility] TexasWhite Blood Count2020-03-10 19:09:00* 



             Test Item    Value        Reference Range Interpretation Comments

 

             White Blood Count (test code = 6690-2) 8.13         4.8-10.8       

            





[facility] TexasRed Blood Count2020-03-10 19:09:00* 



             Test Item    Value        Reference Range Interpretation Comments

 

             Red Blood Count (test code = 789-8) 4.82         4.3-5.7           

         





[facility] TexasHemoglobin2020-03-10 19:09:00* 



             Test Item    Value        Reference Range Interpretation Comments

 

             Hemoglobin (test code = 66178-5) 14.4         14.0-18.0            

      





[facility] TexasHematocrit2020-03-10 19:09:00* 



             Test Item    Value        Reference Range Interpretation Comments

 

             Hematocrit (test code = 4544-3) 42.0         38.2-49.6             

     





[facility] TexasMean Corpuscular Volume2020-03-10 
19:09:00* 



             Test Item    Value        Reference Range Interpretation Comments

 

             Mean Corpuscular Volume (test code = 787-2) 87.1         81-99     

                 





[facility] [place]Mean Corpuscular Hemoglobin2020-03-10 
19:09:00* 



             Test Item    Value        Reference Range Interpretation Comments

 

             Mean Corpuscular Hemoglobin (test code = 785-6) 29.9         28-32 

                     





[facility] [place]Mean Corpuscular Hemoglobin Concent
2020-03-10 19:09:00* 



             Test Item    Value        Reference Range Interpretation Comments

 

             Mean Corpuscular Hemoglobin Concent (test code = 786-4) 34.3       

  31-35                      





[facility] TexasRed Cell Distribution Width2020-03-10 
19:09:00* 



             Test Item    Value        Reference Range Interpretation Comments

 

             Red Cell Distribution Width (test code = 17994-6) 12.7         11.7

-14.4                  





[facility] TexasPlatelet Count2020-03-10 19:09:00* 



             Test Item    Value        Reference Range Interpretation Comments

 

             Platelet Count (test code = 777-3) 120          140-360      L     

        





[facility] TexasNeutrophils (%) (Auto)2020-03-10 19:09:00
  * 



             Test Item    Value        Reference Range Interpretation Comments

 

             Neutrophils (%) (Auto) (test code = 43904-4) 80.8         38.7-80.0

    H             





[facility] TexasLymphocytes (%) (Auto)2020-03-10 19:09:00
  * 



             Test Item    Value        Reference Range Interpretation Comments

 

             Lymphocytes (%) (Auto) (test code = 736-9) 12.5         18.0-39.1  

  L             





[facility] [place]Monocytes (%) (Auto)2020-03-10 19:09:00* 



             Test Item    Value        Reference Range Interpretation Comments

 

             Monocytes (%) (Auto) (test code = 5905-5) 5.7          4.4-11.3    

               





[facility] TexasEosinophils (%) (Auto)2020-03-10 19:09:00
  * 



             Test Item    Value        Reference Range Interpretation Comments

 

             Eosinophils (%) (Auto) (test code = 713-8) 0.4          0.0-6.0    

                





[facility] [place]Basophils (%) (Auto)2020-03-10 19:09:00* 



             Test Item    Value        Reference Range Interpretation Comments

 

             Basophils (%) (Auto) (test code = 706-2) 0.2          0.0-1.0      

              





[facility] TexasIM GRANULOCYTES %2020-03-10 19:09:00* 



             Test Item    Value        Reference Range Interpretation Comments

 

             IM GRANULOCYTES % (test code = IM GRANULOCYTES %) 0.4          0.0-

1.0                    





[facility] TexasNeutrophils # (Auto)2020-03-10 19:09:00* 



             Test Item    Value        Reference Range Interpretation Comments

 

             Neutrophils # (Auto) (test code = 751-8) 6.6          2.1-6.9      

              





[facility] TexasLymphocytes # (Auto)2020-03-10 19:09:00* 



             Test Item    Value        Reference Range Interpretation Comments

 

             Lymphocytes # (Auto) (test code = 37894-9) 1.0          1.0-3.2    

                





[facility] [place]Monocytes # (Auto)2020-03-10 19:09:00* 



             Test Item    Value        Reference Range Interpretation Comments

 

             Monocytes # (Auto) (test code = 742-7) 0.5          0.2-0.8        

            





[facility] TexasEosinophils # (Auto)2020-03-10 19:09:00* 



             Test Item    Value        Reference Range Interpretation Comments

 

             Eosinophils # (Auto) (test code = 711-2) 0.0          0.0-0.4      

              





[facility] [place]Basophils # (Auto)2020-03-10 19:09:00* 



             Test Item    Value        Reference Range Interpretation Comments

 

             Basophils # (Auto) (test code = 704-7) 0.0          0.0-0.1        

            





[facility] TexasAbsolute Immature Granulocyte (auto
2020-03-10 19:09:00* 



             Test Item    Value        Reference Range Interpretation Comments

 

                                        Absolute Immature Granulocyte (auto (nessa

t code = Absolute Immature Granulocyte 

(auto)          0.03            0-0.1                            





[facility]    [place] [address]      
Patient Name: JEYSON MARTINI   MR #: [id number]    : 1960 Age/Sex: 
57/M  Acct #: [id number] Req #: [id number]  Adm Physician:     Ordered by: 
ILDEFONSO SNYDER MD  Report #: [id number]   Location: ER  Room/Bed:     
________________________________________________________________________
___________________________    Procedure: [id number] DX/CHEST 2 VIEWS  Exam Date:
18                            Exam Time: 0400       REPORT STATUS: Signed 
  EXAMINATION:  CHEST 2 VIEWS          INDICATION:           Cough.       COMP
ARISON:  None           FINDINGS:   TUBES and LINES:  None.      LUNGS:  Lungs a
re well inflated.  Lungs are clear.   There is no evidence of   pneumonia or pul
monary edema.      PLEURA:  No pleural effusion or pneumothorax.      HEART AND 
MEDIASTINUM:  The cardiomediastinal silhouette is unremarkable.          BONES A
ND SOFT TISSUES:  No acute osseous lesion.  Soft tissues are   unremarkable.    
 UPPER ABDOMEN: No free air under the diaphragm.          IMPRESSION:    No acu
te thoracic abnormality.         Signed by: Dr. Iam Paul M.D. on 1/3/2018 5:
44 AM        Dictated By: IAM BELTRAN MD  Electronically Signed By: THANIA BELTRAN MD on 1844  Transcribed By: MAKSIM on 1844 
     COPY TO:   ILDEFONSO SNYDER MD

## 2020-09-12 NOTE — XMS REPORT
Continuity of Care Document

                             Created on: 2020



JEYSON MARTINI

External Reference #: [id number]

: 1960

Sex: Male



Demographics





                          Address                   [address] [place] [address]  [address]

 

                          Home Phone                [phone]

 

                          Preferred Language        Unknown

 

                          Marital Status            Unknown

 

                          Advent Affiliation     Unknown

 

                          Race                      Unknown

 

                          Additional Race(s)        Other



 

                          Ethnic Group              Unknown





Author





                          Author                    [facility]

t

 

                          Organization              [facility]

 

                          Address                   [address]

[place]  [address]



 

                          Phone                     Unavailable







Care Team Providers





                    Care Team Member Name Role                Phone

 

                    RASHIDA ARNETT    PCP                 [phone]

 

                    PAULETTE ACEVES      Attphys             Unavailable

 

                    SWEET, RUDDY FREGOSO      Attphys             Unavailable







Payers





           Payer Name Policy Type Policy Number Effective Date Expiration Date S

sonya

 

           [facility]            [id number] 2014 00:00:00     

       [facility]







Problems

This patient has no known problems.



Allergies, Adverse Reactions, Alerts

This patient has no known allergies or adverse reactions.



Medications

This patient has no known medications.



Procedures





                Procedure       Date / Time Performed Performing Clinician Formerly [facility]

e

 

                CT of abdomen and pelvis without contrast 2020 00:00:00 PAULETTE FUENTES   [facility]

 

                EMERGENCY DEPT VISIT 2020-03-10 00:00:00                 [facility]







Encounters





             Start Date/Time End Date/Time Encounter Type Admission Type Attendi

[facility]   Care Department Encounter ID    Source

 

           2020 18:55:00 2020 22:26:00 Departed Emergency Room 1    

      KETAN PAULETTE 

[facility]              [id number]        [facility]

 

          2020-03-10 17:30:00 2020-03-10 19:30:00 Departed Emergency Room       

              [facility]                    [id number]              [facility]

 

          2019 20:18:00 2019 21:32:00 Departed Emergency Room       

              [facility]                    [id number]              [facility]







Results





           Test Description Test Time  Test Comments Results    Result Comments 

Source

 

                CT ABDOMEN/PELVIS WO 2020 20:45:00                        

                              

                                                [facility]                        [address] [address]      Patient Name: JEYSON MARTINI                                
  MR #: [id number]                     : 1960                          
        Age/Sex: 59/M  Acct #: [id number]                              Req #: 
[id number]  Adm Physician:                                                      
Ordered by: PAULETTE ACEVES DO                            Report #: [id number]     
  Location: ER                                      Room/Bed:                   
 
________________________________________________________________________________

___________________    Procedure: [id number] CT/CT ABDOMEN/PELVIS WO  Exam Date: 
20                            Exam Time:                              
                REPORT STATUS: Signed    EXAM: CT Abdomen and Pelvis WITHOUT 
contrast     INDICATION: Left flank pain .         COMPARISON: None.   
TECHNIQUE: Abdomen and pelvis were scanned utilizing a multidetector helical   
scanner from the lung base to the pubic symphysis without administration of IV  
contrast. Absence of intravenous contrast decreases sensitivity for detection   
of focal lesions and vascular pathology. Coronal and sagittal reformations were 
 obtained. Routine protocol was performed.         IV CONTRAST: None        ORAL
CONTRAST: None                  COMPLICATIONS: None      RADIATION DOSE:        
Total DLP: 206 mGy*cm        Estimated effective dose: (DLP x 0.015 x size 
factor) mSv        CTDIvol has been reviewed. It is below the limits set by the 
Radiation   Protocol Committee (RPC).        Dose modulation, iterative 
reconstruction, and/or weight based adjustment   of the mA/kV was utilized to 
reduce the radiation dose to as low as reasonably   achievable.       FINDINGS: 
    LINES and TUBES: None.      LOWER THORAX:  Unremarkable      HEPATOBILIARY: 
    A few scattered small too small to characterize   hypodensities , likely 
benign. No biliary ductal dilation.       GALLBLADDER: No radio-opaque stones or
sludge.  No wall thickening.      SPLEEN: No splenomegaly.       PANCREAS: No 
focal masses or ductal dilatation.        ADRENALS: No adrenal nodules          
KIDNEYS/URETERS: A 3 mm obstructive calculus in the distal left ureter with   
mild upstream left hydroureteronephrosis. No hydronephrosis. No cystic or solid 
 mass lesions.  No stones.      GI TRACT: No abnormal distention, wall 
thickening, or evidence of bowel   obstruction. Small third segment duodenal 
diverticulum. Appendix is normal.      PELVIC ORGANS/BLADDER:  Moderate 
prostatomegaly with subtle internal   calcifications.      LYMPH NODES: No 
lymphadenopathy.      VESSELS: Minimal arterial calcifications.          
PERITONEUM / RETROPERITONEUM: There are 2 tiny calcifications in the lower   
peritoneum, likely chronically torsed , avulsed, calcified epiploic appendices. 
    BONES: Degenerative changes in the spine.       SOFT TISSUES: There is a fat
containing left inguinal hernia.                  IMPRESSION:       A 3 mm 
obstructive calculus in the distal left ureter with mild upstream left   
hydroureteronephrosis.      Moderate prostatomegaly.      Signed by: Ric Zhao DO on 3/29/2020 9:03 PM        Dictated By: RIC ZHAO DO  
Electronically Signed By: RIC ZHAO DO on 20  Transcribed By: 
MAKSIM on 20       COPY TO:   PAULETTE ACEVES DO                       

              

 

                    Urine Color         2020-03-10 19:37:00   

 

                                        Test Item

 

             Urine Color (test code = 5778-6) RED          YELLOW       H       

      





[facility] [id number] 19:37:00* 



             Test Item    Value        Reference Range Interpretation Comments

 

             Urine Clarity (test code = 19714-1) SL CLOUDY    CLEAR        H    

         





[facility] Specific Gravity2020-03-10 19:37:00
  * 



             Test Item    Value        Reference Range Interpretation Comments

 

             Urine Specific Gravity (test code = 5811-5) 1.020        1.010-1.02

5                





[facility] pH2020-03-10 19:37:00* 



             Test Item    Value        Reference Range Interpretation Comments

 

             Urine pH (test code = 20566-1) 6            5-7                    

    





[facility] Leukocyte Esterase2020-03-10 
19:37:00* 



             Test Item    Value        Reference Range Interpretation Comments

 

             Urine Leukocyte Esterase (test code = 5799-2) NEGATIVE     NEGATIVE

                   





[facility] Nitrite2020-03-10 19:37:00* 



             Test Item    Value        Reference Range Interpretation Comments

 

             Urine Nitrite (test code = 97232-8) NEGATIVE     NEGATIVE          

         





[facility] Protein2020-03-10 19:37:00* 



             Test Item    Value        Reference Range Interpretation Comments

 

             Urine Protein (test code = 5804-0) NEGATIVE     NEGATIVE           

        





[facility] Glucose (UA)2020-03-10 19:37:00* 



             Test Item    Value        Reference Range Interpretation Comments

 

             Urine Glucose (UA) (test code = 2349-9) NEGATIVE     NEGATIVE      

             





[facility] Ketones2020-03-10 19:37:00* 



             Test Item    Value        Reference Range Interpretation Comments

 

             Urine Ketones (test code = 93312-5) NEGATIVE     NEGATIVE          

         





[facility] Urobilinogen2020-03-10 19:37:00* 



             Test Item    Value        Reference Range Interpretation Comments

 

             Urine Urobilinogen (test code = 52241-0) 0.2          0.2-1        

              





[facility] Bilirubin2020-03-10 19:37:00* 



             Test Item    Value        Reference Range Interpretation Comments

 

             Urine Bilirubin (test code = 1978-6) NEGATIVE     NEGATIVE         

          





[facility] Blood2020-03-10 19:37:00* 



             Test Item    Value        Reference Range Interpretation Comments

 

             Urine Blood (test code = 04013-6) MODERATE     NEGATIVE            

       





[facility] WBC2020-03-10 19:37:00* 



             Test Item    Value        Reference Range Interpretation Comments

 

             Urine WBC (test code = 5821-4) NONE         0-5                    

    





[facility] RBC2020-03-10 19:37:00* 



             Test Item    Value        Reference Range Interpretation Comments

 

             Urine RBC (test code = 79666-6) 21-50        0-5          H        

     





[facility] Bacteria2020-03-10 19:37:00* 



             Test Item    Value        Reference Range Interpretation Comments

 

             Urine Bacteria (test code = 04814-8) NONE         NONE             

          





[facility] Epithelial Cells2020-03-10 19:37:00
  * 



             Test Item    Value        Reference Range Interpretation Comments

 

             Urine Epithelial Cells (test code = 31400-2) RARE         NONE     

                  





[facility] Red Blood Cell Casts2020-03-10 
19:37:00* 



             Test Item    Value        Reference Range Interpretation Comments

 

             Urine Red Blood Cell Casts (test code = 82198-0) 1-5          >0   

        H             





[facility] Color2020-03-10 19:37:00* 



             Test Item    Value        Reference Range Interpretation Comments

 

             Urine Color (test code = 5778-6) RED          YELLOW       H       

      





[facility] [id number] 19:37:00* 



             Test Item    Value        Reference Range Interpretation Comments

 

             Urine Clarity (test code = 81878-1) SL CLOUDY    CLEAR        H    

         





[facility] Specific Gravity2020-03-10 19:37:00
  * 



             Test Item    Value        Reference Range Interpretation Comments

 

             Urine Specific Gravity (test code = 5811-5) 1.020        1.010-1.02

5                





[facility] pH2020-03-10 19:37:00* 



             Test Item    Value        Reference Range Interpretation Comments

 

             Urine pH (test code = 00185-6) 6            5-7                    

    





[facility] Leukocyte Esterase2020-03-10 
19:37:00* 



             Test Item    Value        Reference Range Interpretation Comments

 

             Urine Leukocyte Esterase (test code = 5799-2) NEGATIVE     NEGATIVE

                   





[facility] Nitrite2020-03-10 19:37:00* 



             Test Item    Value        Reference Range Interpretation Comments

 

             Urine Nitrite (test code = 06010-3) NEGATIVE     NEGATIVE          

         





[facility] Protein2020-03-10 19:37:00* 



             Test Item    Value        Reference Range Interpretation Comments

 

             Urine Protein (test code = 5804-0) NEGATIVE     NEGATIVE           

        





[facility] Glucose (UA)2020-03-10 19:37:00* 



             Test Item    Value        Reference Range Interpretation Comments

 

             Urine Glucose (UA) (test code = 2349-9) NEGATIVE     NEGATIVE      

             





[facility] Ketones2020-03-10 19:37:00* 



             Test Item    Value        Reference Range Interpretation Comments

 

             Urine Ketones (test code = 17803-8) NEGATIVE     NEGATIVE          

         





[facility] Urobilinogen2020-03-10 19:37:00* 



             Test Item    Value        Reference Range Interpretation Comments

 

             Urine Urobilinogen (test code = 23640-8) 0.2          0.2-1        

              





[facility] Bilirubin2020-03-10 19:37:00* 



             Test Item    Value        Reference Range Interpretation Comments

 

             Urine Bilirubin (test code = 1978-6) NEGATIVE     NEGATIVE         

          





[facility] Blood2020-03-10 19:37:00* 



             Test Item    Value        Reference Range Interpretation Comments

 

             Urine Blood (test code = 10451-9) MODERATE     NEGATIVE            

       





[facility] WBC2020-03-10 19:37:00* 



             Test Item    Value        Reference Range Interpretation Comments

 

             Urine WBC (test code = 5821-4) NONE         0-5                    

    





[facility] RBC2020-03-10 19:37:00* 



             Test Item    Value        Reference Range Interpretation Comments

 

             Urine RBC (test code = 00788-2) 21-50        0-5          H        

     





[facility] Bacteria2020-03-10 19:37:00* 



             Test Item    Value        Reference Range Interpretation Comments

 

             Urine Bacteria (test code = 95546-0) NONE         NONE             

          





[facility] Epithelial Cells2020-03-10 19:37:00
  * 



             Test Item    Value        Reference Range Interpretation Comments

 

             Urine Epithelial Cells (test code = 97412-4) RARE         NONE     

                  





[facility] Red Blood Cell Casts2020-03-10 
19:37:00* 



             Test Item    Value        Reference Range Interpretation Comments

 

             Urine Red Blood Cell Casts (test code = 80479-2) 1-5          >0   

        H             





[facility] 



             Test Item    Value        Reference Range Interpretation Comments

 

             Sodium Level (test code = 2951-2) 138          136-145             

       





[facility] Level2020-03-10 19:34:00* 



             Test Item    Value        Reference Range Interpretation Comments

 

             Potassium Level (test code = 2823-3) 3.6          3.5-5.1          

          





[facility] HospitalChloride Level2020-03-10 19:34:00* 



             Test Item    Value        Reference Range Interpretation Comments

 

             Chloride Level (test code = 2075-0) 106                      

         





[facility] Hospital[facility] Dioxide Level2020-03-10 19:34:00* 



             Test Item    Value        Reference Range Interpretation Comments

 

             Carbon Dioxide Level (test code = 2028-9) 27           22-29       

               





[facility] HospitalAnion Gap2020-03-10 19:34:00* 



             Test Item    Value        Reference Range Interpretation Comments

 

             Anion Gap (test code = 33037-3) 8.6          8-16                  

     





[facility] U[facility] Nitrogen2020-03-10 19:34:00* 



             Test Item    Value        Reference Range Interpretation Comments

 

             Blood Urea Nitrogen (test code = 3094-0) 15           7-26         

              





[facility] HospitalCreatinine2020-03-10 19:34:00* 



             Test Item    Value        Reference Range Interpretation Comments

 

             Creatinine (test code = 2160-0) 1.02         0.72-1.25             

     





[facility] HospitalBUN/Creatinine Ratio2020-03-10 19:34:00* 



             Test Item    Value        Reference Range Interpretation Comments

 

             BUN/Creatinine Ratio (test code = 3097-3) 15           -        

               





[facility] HospitalEstimat Glomerular Filtration Rate
2020-03-10 19:34:00* 



             Test Item    Value        Reference Range Interpretation Comments

 

             Estimat Glomerular Filtration Rate (test code = 037452517) > 60    

     >60                        





Ranges were taken from the National Kidney Disease Education Program and the [facility] [employer] literature.Reference ranges:60 or greater: [id number] (
for 3 consecutive months): Chronic kidney disease 15 or less: Kidney failureDriscoll Children's HospitalGlucose Level2020-03-10 19:34:00* 



             Test Item    Value        Reference Range Interpretation Comments

 

             Glucose Level (test code = KHZ2517) 115                      

         





[facility] HospitalCalcium Level2020-03-10 19:34:00* 



             Test Item    Value        Reference Range Interpretation Comments

 

             Calcium Level (test code = 11731-8) 9.1          8.4-10.2          

         





[facility] HospitalTotal Bilirubin2020-03-10 19:34:00* 



             Test Item    Value        Reference Range Interpretation Comments

 

             Total Bilirubin (test code = 1975-2) 0.6          0.2-1.2          

          





[facility] HospitalAspartate Amino Transf (AST/SGOT)
2020-03-10 19:34:00* 



             Test Item    Value        Reference Range Interpretation Comments

 

                                        Aspartate Amino Transf (AST/SGOT) (test 

code = Aspartate Amino Transf 

(AST/SGOT))     22              5-34                             





[facility] HospitalAlanine Aminotransferase (ALT/SGPT)
2020-03-10 19:34:00* 



             Test Item    Value        Reference Range Interpretation Comments

 

             Alanine Aminotransferase (ALT/SGPT) (test code = 1742-6) 28        

   0-55                       





[facility] HospitalTotal Protein2020-03-10 19:34:00* 



             Test Item    Value        Reference Range Interpretation Comments

 

             Total Protein (test code = 2885-2) 7.5          6.5-8.1            

        





[facility] HospitalAlbumin2020-03-10 19:34:00* 



             Test Item    Value        Reference Range Interpretation Comments

 

             Albumin (test code = 1751-7) 4.4          3.5-5.0                  

  





[facility] HospitalGlobulin2020-03-10 19:34:00* 



             Test Item    Value        Reference Range Interpretation Comments

 

             Globulin (test code = 32791-0) 3.1          2.3-3.5                

    





[facility] HospitalAlbumin/Globulin Ratio2020-03-10 19:34:00
  * 



             Test Item    Value        Reference Range Interpretation Comments

 

             Albumin/Globulin Ratio (test code = 1759-0) 1.4          0.8-2.0   

                 





[facility] HospitalAlkaline Phosphatase2020-03-10 19:34:00* 



             Test Item    Value        Reference Range Interpretation Comments

 

             Alkaline Phosphatase (test code = 6768-6) 72                 

               





[facility] HospitalCreatine [id number] 19:34:00* 



             Test Item    Value        Reference Range Interpretation Comments

 

             Creatine Kinase (test code = 2157-6) 254                 H   

          





[facility] HospitalCreatine Kinase [id number] 19:34:00* 



             Test Item    Value        Reference Range Interpretation Comments

 

             Creatine Kinase MB (test code = 19678-6) 1.60         0-5.0        

              





[facility] HospitalTroponin -03-10 19:34:00* 



             Test Item    Value        Reference Range Interpretation Comments

 

             Troponin I (test code = DEQ8524) < 0.001      0-0.300              

      





[facility] 19:34:00* 



             Test Item    Value        Reference Range Interpretation Comments

 

             Sodium Level (test code = 2951-2) 138          136-145             

       





[facility] HospitalPotassium Level2020-03-10 19:34:00* 



             Test Item    Value        Reference Range Interpretation Comments

 

             Potassium Level (test code = 2823-3) 3.6          3.5-5.1          

          





[facility] HospitalChloride Level2020-03-10 19:34:00* 



             Test Item    Value        Reference Range Interpretation Comments

 

             Chloride Level (test code = 2075-0) 106                      

         





[facility] HospitalCarbon Dioxide Level2020-03-10 19:34:00* 



             Test Item    Value        Reference Range Interpretation Comments

 

             Carbon Dioxide Level (test code = 2028-9) 27           22-29       

               





[facility] HospitalAnion Gap2020-03-10 19:34:00* 



             Test Item    Value        Reference Range Interpretation Comments

 

             Anion Gap (test code = 33037-3) 8.6          8-16                  

     





[facility] HospitalBlood Urea Nitrogen2020-03-10 19:34:00* 



             Test Item    Value        Reference Range Interpretation Comments

 

             Blood Urea Nitrogen (test code = 3094-0) 15           7-26         

              





[facility] HospitalCreatinine2020-03-10 19:34:00* 



             Test Item    Value        Reference Range Interpretation Comments

 

             Creatinine (test code = 2160-0) 1.02         0.72-1.25             

     





[facility] HospitalBUN/Creatinine Ratio2020-03-10 19:34:00* 



             Test Item    Value        Reference Range Interpretation Comments

 

             BUN/Creatinine Ratio (test code = 3097-3) 15           6-        

               





[facility] HospitalEstimat Glomerular Filtration Rate
2020-03-10 19:34:00* 



             Test Item    Value        Reference Range Interpretation Comments

 

             Estimat Glomerular Filtration Rate (test code = 528722040) > 60    

     >60                        





Ranges were taken from the National Kidney Disease Education Program and the [facility] literature.Reference ranges:60 or greater: [id number] (
for 3 consecutive months): Chronic kidney disease 15 or less: Kidney failureDriscoll Children's HospitalGlucose Level2020-03-10 19:34:00* 



             Test Item    Value        Reference Range Interpretation Comments

 

             Glucose Level (test code = KYJ9619) 115                      

         





[facility] HospitalCalcium Level2020-03-10 19:34:00* 



             Test Item    Value        Reference Range Interpretation Comments

 

             Calcium Level (test code = 96270-5) 9.1          8.4-10.2          

         





[facility] HospitalTotal Bilirubin2020-03-10 19:34:00* 



             Test Item    Value        Reference Range Interpretation Comments

 

             Total Bilirubin (test code = 1975-2) 0.6          0.2-1.2          

          





[facility] HospitalAspartate Amino Transf (AST/SGOT)
2020-03-10 19:34:00* 



             Test Item    Value        Reference Range Interpretation Comments

 

                                        Aspartate Amino Transf (AST/SGOT) (test 

code = Aspartate Amino Transf 

(AST/SGOT))     22              5-34                             





[facility]Al[facility]
2020-03-10 19:34:00* 



             Test Item    Value        Reference Range Interpretation Comments

 

             Alanine Aminotransferase (ALT/SGPT) (test code = 1742-6) 28        

   0-55                       





[facility]Total [facility]2020-03-[facility] 19:34:00* 



             Test Item    Value        Reference Range Interpretation Comments

 

             Total Protein (test code = 2885-2) 7.5          6.5-8.1            

        





[facility] HospitalAlbumin2020-03-[facility] 19:34:00* 



             Test Item    Value        Reference Range Interpretation Comments

 

             Albumin (test code = 1751-7) 4.4          3.5-5.0                  

  





[facility] HospitalGlobulin2020-[facility] 19:34:00* 



             Test Item    Value        Reference Range Interpretation Comments

 

             Globulin (test code = 79776-2) 3.1          2.3-3.5                

    





[facility] HospitalAlbumin/Globulin Ratio2020-03-10 19:34:00
  * 



             Test Item    Value        Reference Range Interpretation Comments

 

             Albumin/Globulin Ratio (test code = 1759-0) 1.4          0.8-2.0   

                 





[facility] HospitalAlkaline Phosphatase2020-03-10 19:34:00* 



             Test Item    Value        Reference Range Interpretation Comments

 

             Alkaline Phosphatase (test code = 6768-6) 72                 

               





[facility] HospitalCreatine [id number] 19:34:00* 



             Test Item    Value        Reference Range Interpretation Comments

 

             Creatine Kinase (test code = 2157-6) 254                 H   

          





[facility] HospitalCreatine Kinase [id number] 19:34:00* 



             Test Item    Value        Reference Range Interpretation Comments

 

             Creatine Kinase MB (test code = 07989-6) 1.60         0-5.0        

              





[facility] HospitalTroponin -03-10 19:34:00* 



             Test Item    Value        Reference Range Interpretation Comments

 

             Troponin I (test code = YTK6661) < 0.001      0-0.300              

      





[facility] HospitalProthrombin Time2020-03-10 19:23:00* 



             Test Item    Value        Reference Range Interpretation Comments

 

             Prothrombin Time (test code = 5902-2) 13.1         11.9-14.5       

           





[facility] HospitalProthromb Time International Ratio
2020-03-10 19:23:00* 



             Test Item    Value        Reference Range Interpretation Comments

 

             Prothromb Time International Ratio (test code = 6301-6) 0.94       

                             





Oral Anticoagulant Therapy INR Values:1. Low Intensity Therapy        1.5 - 2.02
. Moderate Intensity Therapy   2.0 - 3.03. High Intensity Therapy(1)    2.5 - 3.
54. High Intensity Therapy(2)    3.0 - 4.05. Panic Value INR              > 5.0
[facility] HospitalActivated Partial Thromboplast Time
2020-03-10 19:23:00* 



             Test Item    Value        Reference Range Interpretation Comments

 

             Activated Partial Thromboplast Time (test code = 95888-2) 29.4     

    23.8-35.5                  





[facility] HospitalProthrombin Time2020-03-10 19:23:00* 



             Test Item    Value        Reference Range Interpretation Comments

 

             Prothrombin Time (test code = 5902-2) 13.1         11.9-14.5       

           





[facility] HospitalProthromb Time International Ratio
2020-03-10 19:23:00* 



             Test Item    Value        Reference Range Interpretation Comments

 

             Prothromb Time International Ratio (test code = 6301-6) 0.94       

                             





Oral Anticoagulant Therapy INR Values:1. Low Intensity Therapy        1.5 - 2.02
. Moderate Intensity Therapy   2.0 - 3.03. High Intensity Therapy(1)    2.5 - 3.
54. High Intensity Therapy(2)    3.0 - 4.05. Panic Value INR              > 5.0
[facility] HospitalActivated Partial Thromboplast Time
2020-03-10 19:23:00* 



             Test Item    Value        Reference Range Interpretation Comments

 

             Activated Partial Thromboplast Time (test code = 96208-5) 29.4     

    23.8-35.5                  





[facility] HospitalWhite Blood Count2020-03-10 19:09:00* 



             Test Item    Value        Reference Range Interpretation Comments

 

             White Blood Count (test code = 6690-2) 8.13         4.8-10.8       

            





[facility] HospitalRed Blood Count2020-03-10 19:09:00* 



             Test Item    Value        Reference Range Interpretation Comments

 

             Red Blood Count (test code = 789-8) 4.82         4.3-5.7           

         





[facility] HospitalHemoglobin2020-03-10 19:09:00* 



             Test Item    Value        Reference Range Interpretation Comments

 

             Hemoglobin (test code = 14978-3) 14.4         14.0-18.0            

      





[facility] HospitalHematocrit2020-03-10 19:09:00* 



             Test Item    Value        Reference Range Interpretation Comments

 

             Hematocrit (test code = 4544-3) 42.0         38.2-49.6             

     





[facility] HospitalMean Corpuscular Volume2020-03-10 
19:09:00* 



             Test Item    Value        Reference Range Interpretation Comments

 

             Mean Corpuscular Volume (test code = 787-2) 87.1         81-99     

                 





[facility] HospitalMean Corpuscular Hemoglobin2020-03-10 
19:09:00* 



             Test Item    Value        Reference Range Interpretation Comments

 

             Mean Corpuscular Hemoglobin (test code = 785-6) 29.9         28-32 

                     





[facility] HospitalMean Corpuscular Hemoglobin Concent
2020-03-10 19:09:00* 



             Test Item    Value        Reference Range Interpretation Comments

 

             Mean Corpuscular Hemoglobin Concent (test code = 786-4) 34.3       

  31-35                      





[facility] HospitalRed Cell Distribution Width2020-03-10 
19:09:00* 



             Test Item    Value        Reference Range Interpretation Comments

 

             Red Cell Distribution Width (test code = 89562-3) 12.7         11.7

-14.4                  





[facility] HospitalPlatelet Count2020-03-10 19:09:00* 



             Test Item    Value        Reference Range Interpretation Comments

 

             Platelet Count (test code = 777-3) 120          140-360      L     

        





[facility] HospitalNeutrophils (%) (Auto)2020-03-10 19:09:00
  * 



             Test Item    Value        Reference Range Interpretation Comments

 

             Neutrophils (%) (Auto) (test code = 98459-8) 80.8         38.7-80.0

    H             





[facility] HospitalLymphocytes (%) (Auto)2020-03-10 19:09:00
  * 



             Test Item    Value        Reference Range Interpretation Comments

 

             Lymphocytes (%) (Auto) (test code = 736-9) 12.5         18.0-39.1  

  L             





[facility] HospitalMonocytes (%) (Auto)2020-03-10 19:09:00* 



             Test Item    Value        Reference Range Interpretation Comments

 

             Monocytes (%) (Auto) (test code = 5905-5) 5.7          4.4-11.3    

               





[facility] HospitalEosinophils (%) (Auto)2020-03-10 19:09:00
  * 



             Test Item    Value        Reference Range Interpretation Comments

 

             Eosinophils (%) (Auto) (test code = 713-8) 0.4          0.0-6.0    

                





[facility] HospitalBasophils (%) (Auto)2020-03-10 19:09:00* 



             Test Item    Value        Reference Range Interpretation Comments

 

             Basophils (%) (Auto) (test code = 706-2) 0.2          0.0-1.0      

              





[facility] HospitalIM GRANULOCYTES %2020-03-10 19:09:00* 



             Test Item    Value        Reference Range Interpretation Comments

 

             IM GRANULOCYTES % (test code = IM GRANULOCYTES %) 0.4          0.0-

1.0                    





[facility] HospitalNeutrophils # (Auto)2020-03-10 19:09:00* 



             Test Item    Value        Reference Range Interpretation Comments

 

             Neutrophils # (Auto) (test code = 751-8) 6.6          2.1-6.9      

              





[facility] HospitalLymphocytes # (Auto)2020-03-10 19:09:00* 



             Test Item    Value        Reference Range Interpretation Comments

 

             Lymphocytes # (Auto) (test code = 11503-4) 1.0          1.0-3.2    

                





[facility] HospitalMonocytes # (Auto)2020-03-10 19:09:00* 



             Test Item    Value        Reference Range Interpretation Comments

 

             Monocytes # (Auto) (test code = 742-7) 0.5          0.2-0.8        

            





[facility] HospitalEosinophils # (Auto)2020-03-10 19:09:00* 



             Test Item    Value        Reference Range Interpretation Comments

 

             Eosinophils # (Auto) (test code = 711-2) 0.0          0.0-0.4      

              





[facility] HospitalBasophils # (Auto)2020-03-10 19:09:00* 



             Test Item    Value        Reference Range Interpretation Comments

 

             Basophils # (Auto) (test code = 704-7) 0.0          0.0-0.1        

            





[facility] HospitalAbsolute Immature Granulocyte (auto
2020-03-10 19:09:00* 



             Test Item    Value        Reference Range Interpretation Comments

 

                                        Absolute Immature Granulocyte (auto (nessa

t code = Absolute Immature Granulocyte 

(auto)          0.03            0-0.1                            





[facility] HospitalWhite Blood Count2020-03-10 19:09:00* 



             Test Item    Value        Reference Range Interpretation Comments

 

             White Blood Count (test code = 6690-2) 8.13         4.8-10.8       

            





[facility] HospitalRed Blood Count2020-03-10 19:09:00* 



             Test Item    Value        Reference Range Interpretation Comments

 

             Red Blood Count (test code = 789-8) 4.82         4.3-5.7           

         





[facility] HospitalHemoglobin2020-03-10 19:09:00* 



             Test Item    Value        Reference Range Interpretation Comments

 

             Hemoglobin (test code = 90066-0) 14.4         14.0-18.0            

      





[facility] HospitalHematocrit2020-03-10 19:09:00* 



             Test Item    Value        Reference Range Interpretation Comments

 

             Hematocrit (test code = 4544-3) 42.0         38.2-49.6             

     





[facility] HospitalMean Corpuscular Volume2020-03-10 
19:09:00* 



             Test Item    Value        Reference Range Interpretation Comments

 

             Mean Corpuscular Volume (test code = 787-2) 87.1         81-99     

                 





[facility] HospitalMean Corpuscular Hemoglobin2020-03-10 
19:09:00* 



             Test Item    Value        Reference Range Interpretation Comments

 

             Mean Corpuscular Hemoglobin (test code = 785-6) 29.9         28-32 

                     





[facility] HospitalMean Corpuscular Hemoglobin Concent
2020-03-10 19:09:00* 



             Test Item    Value        Reference Range Interpretation Comments

 

             Mean Corpuscular Hemoglobin Concent (test code = 786-4) 34.3       

  31-35                      





[facility] HospitalRed Cell Distribution Width2020-03-10 
19:09:00* 



             Test Item    Value        Reference Range Interpretation Comments

 

             Red Cell Distribution Width (test code = 36344-6) 12.7         11.7

-14.4                  





[facility] HospitalPlatelet Count2020-03-10 19:09:00* 



             Test Item    Value        Reference Range Interpretation Comments

 

             Platelet Count (test code = 777-3) 120          140-360      L     

        





[facility] HospitalNeutrophils (%) (Auto)2020-03-10 19:09:00
  * 



             Test Item    Value        Reference Range Interpretation Comments

 

             Neutrophils (%) (Auto) (test code = 45366-1) 80.8         38.7-80.0

    H             





[facility] HospitalLymphocytes (%) (Auto)2020-03-10 19:09:00
  * 



             Test Item    Value        Reference Range Interpretation Comments

 

             Lymphocytes (%) (Auto) (test code = 736-9) 12.5         18.0-39.1  

  L             





[facility] HospitalMonocytes (%) (Auto)2020-03-10 19:09:00* 



             Test Item    Value        Reference Range Interpretation Comments

 

             Monocytes (%) (Auto) (test code = 5905-5) 5.7          4.4-11.3    

               





[facility] HospitalEosinophils (%) (Auto)2020-03-10 19:09:00
  * 



             Test Item    Value        Reference Range Interpretation Comments

 

             Eosinophils (%) (Auto) (test code = 713-8) 0.4          0.0-6.0    

                





[facility] HospitalBasophils (%) (Auto)2020-03-10 19:09:00* 



             Test Item    Value        Reference Range Interpretation Comments

 

             Basophils (%) (Auto) (test code = 706-2) 0.2          0.0-1.0      

              





[facility] HospitalIM GRANULOCYTES %2020-03-10 19:09:00* 



             Test Item    Value        Reference Range Interpretation Comments

 

             IM GRANULOCYTES % (test code = IM GRANULOCYTES %) 0.4          0.0-

1.0                    





[facility] HospitalNeutrophils # (Auto)2020-03-10 19:09:00* 



             Test Item    Value        Reference Range Interpretation Comments

 

             Neutrophils # (Auto) (test code = 751-8) 6.6          2.1-6.9      

              





[facility] HospitalLymphocytes # (Auto)2020-03-10 19:09:00* 



             Test Item    Value        Reference Range Interpretation Comments

 

             Lymphocytes # (Auto) (test code = 40249-8) 1.0          1.0-3.2    

                





[facility] HospitalMonocytes # (Auto)2020-03-10 19:09:00* 



             Test Item    Value        Reference Range Interpretation Comments

 

             Monocytes # (Auto) (test code = 742-7) 0.5          0.2-0.8        

            





[facility] HospitalEosinophils # (Auto)2020-03-10 19:09:00* 



             Test Item    Value        Reference Range Interpretation Comments

 

             Eosinophils # (Auto) (test code = 711-2) 0.0          0.0-0.4      

              





[facility] HospitalBasophils # (Auto)2020-03-10 19:09:00* 



             Test Item    Value        Reference Range Interpretation Comments

 

             Basophils # (Auto) (test code = 704-7) 0.0          0.0-0.1        

            





[facility] HospitalAbsolute Immature Granulocyte (auto
2020-03-10 19:09:00* 



             Test Item    Value        Reference Range Interpretation Comments

 

                                        Absolute Immature Granulocyte (auto (nessa

t code = Absolute Immature Granulocyte 

(auto)          0.03            0-0.1                            





[facility]    [place] [address]      
Patient Name: JEYSON MARTINI   MR #: [id number]    : 1960 Age/Sex: 
57/M  Acct #: [id number] Req #: [id number]  Adm Physician:     Ordered by: 
ILDEFONSO SNYDER MD  Report #: [id number]   Location: ER  Room/Bed:     
________________________________________________________________________
___________________________    Procedure: [id number] DX/CHEST 2 VIEWS  Exam Date:
18                            Exam Time: 0400       REPORT STATUS: Signed 
  EXAMINATION:  CHEST 2 VIEWS          INDICATION:           Cough.       COMP
ARISON:  None           FINDINGS:   TUBES and LINES:  None.      LUNGS:  Lungs a
re well inflated.  Lungs are clear.   There is no evidence of   pneumonia or pul
monary edema.      PLEURA:  No pleural effusion or pneumothorax.      HEART AND 
MEDIASTINUM:  The cardiomediastinal silhouette is unremarkable.          BONES A
ND SOFT TISSUES:  No acute osseous lesion.  Soft tissues are   unremarkable.    
 UPPER ABDOMEN: No free air under the diaphragm.          IMPRESSION:    No acu
te thoracic abnormality.         Signed by: Dr. Iam Paul M.D. on 1/3/2018 5:
44 AM        Dictated By: IAM BELTRAN MD  Electronically Signed By: THANIA BELTRAN MD on 1844  Transcribed By: MAKSIM on 1844 
     COPY TO:   ILDEFONSO SNYDER MD

## 2020-09-12 NOTE — EMERGENCY DEPARTMENT NOTE
History of Present Illnes


History of Present Illness


History of Present Illness


This is a 59 year old  male sent by Urology for fever s/p prostate 

biopsy procedure. Denies Cough, SOB, abd pain or HA. 


.


Onset (how long ago):  hour(s) (1)


Radiation:  Reports non-radiation


Severity:  moderate


Duration (how long):  hour(s) (1)


Timing of current episode:  constant


Progression:  worsening


Context:  Reports recent surgery


Relieving factors:  none


Exacerbating factors:  none


Associated symptoms:  Reports fever/chills





Past Medical/Family History


Physician Review


I have reviewed the patient's past medical and family history.  Any updates have

been documented here.





Past Medical History


Recent Fever:  Yes


Clinical Suspicion of Infectio:  Yes


New/Unexplained Change in Ment:  No


Past Medical History:  Hypertension, Asthma


Past Surgical History:  None


Other Surgery:  


Prostate biopsy





Social History


Smoking Cessation:  Never Smoker


Alcohol Use:  None


Any Illegal Drug Use:  No





Other


Last Tetanus:  UTD





Review of Systems


Review of Systems


Constitutional:  Reports chills, Reports fever


EENTM:  Reports no symptoms


Cardiovascular:  Reports no symptoms


Respiratory:  Reports no symptoms


Gastrointestinal:  Reports no symptoms


Genitourinary:  Reports no symptoms


Musculoskeletal:  Reports no symptoms


Integumentary:  Reports no symptoms


Neurological:  Reports no symptoms


Psychological:  Reports no symptoms


Endocrine:  Reports no symptoms


Hematological/Lymphatic:  Reports no symptoms





Physical Exam


Related Data


Allergies:  


Coded Allergies:  


     No Known Drug Allergies (Verified  Allergy, Unknown, NONE, 11/3/09)


Triage Vital Signs





Vital Signs








  Date Time  Temp Pulse Resp B/P (MAP) Pulse Ox O2 Delivery O2 Flow Rate FiO2


 


20 18:38 105.2 133 33 145/92 94 Room Air  








Vital signs reviewed:  Yes





Physical Exam


CONSTITUTIONAL





Constitutional:  Present well-developed, Present well-nourished


HENT


HENT:  Present normocephalic, Present atraumatic, Present oropharynx 

clear/moist, Present nose normal


HENT L/R:  Present left ext ear normal, Present right ext ear normal


EYES





Eyes:  Reports PERRL, Reports conjunctivae normal


NECK


Neck:  Present ROM normal


PULMONARY


Pulmonary:  Present effort normal, Present breath sounds normal


CARDIOVASCULAR





Cardiovascular:  Present heart sounds normal, Present capillary refill normal, 

Present tachycardia


GASTROINTESTINAL





Abdominal:  Present soft, Present nontender, Present bowel sounds normal


GENITOURINARY





Genitourinary:  Present exam deferred


SKIN


Skin:  Present warm, Present dry


MUSCULOSKELETAL





Musculoskeletal:  Present ROM normal


NEUROLOGICAL





Neurological:  Present alert, Present oriented x 3, Present no gross motor or 

sensory deficits


PSYCHOLOGICAL


Psychological:  Present mood/affect normal, Present judgement normal





Results


Laboratory


Lab results reviewed:  Yes


Laboratory comments





Laboratory Tests








Test


 20


18:56


 


White Blood Count


 4.35 x10e3/uL


(4.8-10.8)


 


Red Blood Count


 4.78 x10e6/uL


(4.3-5.7)


 


Hemoglobin


 14.3 g/dL


(14.0-18.0)


 


Hematocrit


 41.3 %


(38.2-49.6)


 


Mean Corpuscular Volume


 86.4 fL


(81-99)


 


Mean Corpuscular Hemoglobin


 29.9 pg


(28-32)


 


Mean Corpuscular Hemoglobin


Concent 34.6 g/dL


(31-35)


 


Red Cell Distribution Width


 12.5 %


(11.7-14.4)


 


Platelet Count


 96 x10e3/uL


(140-360)


 


Neutrophils (%) (Auto)


 89.0 %


(38.7-80.0)


 


Lymphocytes (%) (Auto)


 10.1 %


(18.0-39.1)


 


Monocytes (%) (Auto)


 0.5  %


(4.4-11.3)


 


Eosinophils (%) (Auto)


 0.2 %


(0.0-6.0)


 


Basophils (%) (Auto)


 0.0 %


(0.0-1.0)


 


Neutrophils # (Auto) 3.9 (2.1-6.9) 


 


Lymphocytes # (Auto) 0.4 (1.0-3.2) 


 


Monocytes # (Auto) 0.0 (0.2-0.8) 


 


Eosinophils # (Auto) 0.0 (0.0-0.4) 


 


Basophils # (Auto) 0.0 (0.0-0.1) 


 


Absolute Immature Granulocyte


(auto 0.01 x10e3/uL


(0-0.1)


 


Sodium Level


 136 mmol/L


(136-145)


 


Potassium Level


 3.4 mmol/L


(3.5-5.1)


 


Chloride Level


 104 mmol/L


()


 


Carbon Dioxide Level


 18 mmol/L


(22-29)


 


Anion Gap


 17.4 mmol/L


(8-16)


 


Blood Urea Nitrogen


 14 mg/dL


(7-26)


 


Creatinine


 0.91 mg/dL


(0.72-1.25)


 


Estimat Glomerular Filtration


Rate > 60 ML/MIN


(60-)


 


BUN/Creatinine Ratio 15 (6-25) 


 


Glucose Level


 129 mg/dL


()


 


Lactic Acid Level


 1.7 mmol/L


(0.5-2.0)


 


Calcium Level


 8.1 mg/dL


(8.4-10.2)


 


Total Bilirubin


 0.7 mg/dL


(0.2-1.2)


 


Aspartate Amino Transf


(AST/SGOT) 17 IU/L (5-34) 





 


Alanine Aminotransferase


(ALT/SGPT) 27 IU/L (0-55) 





 


Alkaline Phosphatase


 67 IU/L


()


 


Total Protein


 7.0 g/dL


(6.5-8.1)


 


Albumin


 4.3 g/dL


(3.5-5.0)


 


Globulin


 2.7 g/dL


(2.3-3.5)


 


Albumin/Globulin Ratio 1.6 (0.8-2.0) 


 


Coronavirus (PCR)


 Not detected


(NOTDETECTED)











Imaging


Imaging results reviewed:  Yes


Impressions


                                        


                        [place] [address]








Patient Name: JEYSON MARTINI                          MR #: [id number]       


      


: 1960                         Age/Sex: 59/M


Acct #: [id number]                    Req #: [id number]


Adm Physician: SIRIA LIN MD                          


Ordered by: PAULETTE ACEVES DO                    Report #: [id number] 


Location: [facility]                        Room/Bed: [place] [address]  


                                    ____________________________________________


_______________________________________________________





Procedure: [id number] DX/CHEST SINGLE (PORTABLE)


Exam Date: 20                            Exam Time: 1900








                              REPORT STATUS: Signed





EXAMINATION:  CHEST SINGLE (PORTABLE)    





INDICATION:      Fever





COMPARISON:  None


     


FINDINGS:    





TUBES and LINES:  None.





LUNGS:  Normal lung volumes.  Lungs are clear.  No consolidations.





PLEURA:  No pleural effusion or pneumothorax.





HEART AND MEDIASTINUM:  The cardiomediastinal silhouette is unremarkable.    





BONES AND SOFT TISSUES:  No acute osseous lesion.  Soft tissues are


unremarkable.





UPPER ABDOMEN: No free air under the diaphragm.    





IMPRESSION: 





No acute thoracic radiographic abnormality.








Signed by: Ric Zhao DO on 2020 8:19 PM








Dictated By: RIC ZHAO DO


Electronically Signed By: RIC ZHAO DO on 20


Transcribed By: MAKSIM on 20 








COPY TO:   PAULETTE ACEVES DO~





Procedures


12 Lead ECG Interpretation


ECG Interpretation :  


   ECG:  ECG 1


   :  Interpreted by ED physician


   Date:  Sep 12, 2020


   Time:  19:16


   Prior ECG tracings:  reviewed


   Rhythm:  sinus tachycardia


   Rate:  tachycardia


   BPM:  136


   QRS axis:  normal


   ST segments normal:  Yes


   T waves normal:  Yes


   Other findings:  prolonged QTc interval


   Clinical Impression:  abnormal ECG





Assessment & Plan


Medical Decision Making


MDM


Diff Dx : sepsis, UTI, PNA, Covid-19, prostatitis. Labs reviewed. No Clear 

source of infection





Assessment & Plan


Final Impression:  


(1) SIRS (systemic inflammatory response syndrome)


Depart Disposition:  ADMITTED


Last Vital Signs











  Date Time  Temp Pulse Resp B/P (MAP) Pulse Ox O2 Delivery O2 Flow Rate FiO2


 


20 20:45  118 20  95   


 


20 20:36    132/82  Room Air  


 


20 20:21 103.1       

















PAULETTE ACEVES DO                Sep 12, 2020 18:30

## 2020-09-12 NOTE — NUR
Received pt. from ER via stretcher. Alert and oriented x3. Respirations are even, unlabored. 
IV 20 gauge saline lock to his R antecubital area.

## 2020-09-13 VITALS — SYSTOLIC BLOOD PRESSURE: 156 MMHG | DIASTOLIC BLOOD PRESSURE: 87 MMHG

## 2020-09-13 VITALS — SYSTOLIC BLOOD PRESSURE: 94 MMHG | DIASTOLIC BLOOD PRESSURE: 69 MMHG

## 2020-09-13 VITALS — SYSTOLIC BLOOD PRESSURE: 112 MMHG | DIASTOLIC BLOOD PRESSURE: 82 MMHG

## 2020-09-13 VITALS — DIASTOLIC BLOOD PRESSURE: 71 MMHG | SYSTOLIC BLOOD PRESSURE: 111 MMHG

## 2020-09-13 VITALS — DIASTOLIC BLOOD PRESSURE: 79 MMHG | SYSTOLIC BLOOD PRESSURE: 138 MMHG

## 2020-09-13 VITALS — SYSTOLIC BLOOD PRESSURE: 101 MMHG | DIASTOLIC BLOOD PRESSURE: 66 MMHG

## 2020-09-13 VITALS — DIASTOLIC BLOOD PRESSURE: 77 MMHG | SYSTOLIC BLOOD PRESSURE: 117 MMHG

## 2020-09-13 LAB
ALBUMIN SERPL-MCNC: 3.4 G/DL (ref 3.5–5)
ALBUMIN/GLOB SERPL: 1.7 {RATIO} (ref 0.8–2)
ALP SERPL-CCNC: 54 IU/L (ref 40–150)
ALT SERPL-CCNC: 66 IU/L (ref 0–55)
ANION GAP SERPL CALC-SCNC: 16.1 MMOL/L (ref 8–16)
BASOPHILS # BLD AUTO: 0 10*3/UL (ref 0–0.1)
BASOPHILS NFR BLD AUTO: 0.1 % (ref 0–1)
BUN SERPL-MCNC: 11 MG/DL (ref 7–26)
BUN/CREAT SERPL: 10 (ref 6–25)
CALCIUM SERPL-MCNC: 7.3 MG/DL (ref 8.4–10.2)
CHLORIDE SERPL-SCNC: 109 MMOL/L (ref 98–107)
CK MB SERPL-MCNC: 0.9 NG/ML (ref 0–5)
CK MB SERPL-MCNC: 1 NG/ML (ref 0–5)
CK MB SERPL-MCNC: 1.6 NG/ML (ref 0–5)
CK SERPL-CCNC: 116 IU/L (ref 30–200)
CK SERPL-CCNC: 124 IU/L (ref 30–200)
CK SERPL-CCNC: 179 IU/L (ref 30–200)
CO2 SERPL-SCNC: 19 MMOL/L (ref 22–29)
DEPRECATED NEUTROPHILS # BLD AUTO: 7.2 10*3/UL (ref 2.1–6.9)
EGFRCR SERPLBLD CKD-EPI 2021: > 60 ML/MIN (ref 60–?)
EOSINOPHIL # BLD AUTO: 0 10*3/UL (ref 0–0.4)
EOSINOPHIL NFR BLD AUTO: 0 % (ref 0–6)
ERYTHROCYTE [DISTWIDTH] IN CORD BLOOD: 13 % (ref 11.7–14.4)
GLOBULIN PLAS-MCNC: 2 G/DL (ref 2.3–3.5)
GLUCOSE SERPLBLD-MCNC: 112 MG/DL (ref 74–118)
HCT VFR BLD AUTO: 36.5 % (ref 38.2–49.6)
HGB BLD-MCNC: 12.5 G/DL (ref 14–18)
LYMPHOCYTES # BLD: 0.2 10*3/UL (ref 1–3.2)
LYMPHOCYTES NFR BLD AUTO: 2.4 % (ref 18–39.1)
LYMPHOCYTES NFR BLD MANUAL: 3 % (ref 19–48)
MCH RBC QN AUTO: 31.4 PG (ref 28–32)
MCHC RBC AUTO-ENTMCNC: 34.2 G/DL (ref 31–35)
MCV RBC AUTO: 91.7 FL (ref 81–99)
MONOCYTES # BLD AUTO: 0.1 10*3/UL (ref 0.2–0.8)
MONOCYTES NFR BLD AUTO: 1.5 % (ref 4.4–11.3)
MONOCYTES NFR BLD MANUAL: 2 % (ref 3.4–9)
NEUTS BAND NFR BLD MANUAL: 3 %
NEUTS SEG NFR BLD AUTO: 95.6 % (ref 38.7–80)
NEUTS SEG NFR BLD MANUAL: 92 % (ref 40–74)
PLAT MORPH BLD: (no result)
PLATELET # BLD AUTO: 72 X10E3/UL (ref 140–360)
PLATELET # BLD EST: (no result) 10*3/UL
POTASSIUM SERPL-SCNC: 3.1 MMOL/L (ref 3.5–5.1)
RBC # BLD AUTO: 3.98 X10E6/UL (ref 4.3–5.7)
RBC MORPH BLD: NORMAL
SODIUM SERPL-SCNC: 141 MMOL/L (ref 136–145)

## 2020-09-13 RX ADMIN — TAZOBACTAM SODIUM AND PIPERACILLIN SODIUM SCH MLS/HR: 375; 3 INJECTION, SOLUTION INTRAVENOUS at 23:59

## 2020-09-13 RX ADMIN — TAZOBACTAM SODIUM AND PIPERACILLIN SODIUM SCH MLS/HR: 375; 3 INJECTION, SOLUTION INTRAVENOUS at 11:33

## 2020-09-13 RX ADMIN — SODIUM CHLORIDE SCH MLS/HR: 9 INJECTION, SOLUTION INTRAVENOUS at 19:50

## 2020-09-13 RX ADMIN — TAZOBACTAM SODIUM AND PIPERACILLIN SODIUM SCH MLS/HR: 375; 3 INJECTION, SOLUTION INTRAVENOUS at 05:56

## 2020-09-13 RX ADMIN — Medication PRN MG: at 19:38

## 2020-09-13 RX ADMIN — SODIUM CHLORIDE SCH MLS/HR: 9 INJECTION, SOLUTION INTRAVENOUS at 08:33

## 2020-09-13 RX ADMIN — SODIUM CHLORIDE SCH MLS/HR: 9 INJECTION, SOLUTION INTRAVENOUS at 19:45

## 2020-09-13 RX ADMIN — Medication PRN MG: at 12:46

## 2020-09-13 RX ADMIN — TAZOBACTAM SODIUM AND PIPERACILLIN SODIUM SCH MLS/HR: 375; 3 INJECTION, SOLUTION INTRAVENOUS at 00:19

## 2020-09-13 RX ADMIN — TAZOBACTAM SODIUM AND PIPERACILLIN SODIUM SCH MLS/HR: 375; 3 INJECTION, SOLUTION INTRAVENOUS at 19:45

## 2020-09-13 NOTE — NUR
Spoke with Dr Marie regarding elevated temp. Temp 100.1 gave tylenol 650 mg po 1940. Temp 
102.1, patient shivering and shaking. New order for motrin 400mg po x1 now then Q 6hrs PRN.

## 2020-09-14 VITALS — SYSTOLIC BLOOD PRESSURE: 145 MMHG | DIASTOLIC BLOOD PRESSURE: 85 MMHG

## 2020-09-14 VITALS — SYSTOLIC BLOOD PRESSURE: 140 MMHG | DIASTOLIC BLOOD PRESSURE: 99 MMHG

## 2020-09-14 VITALS — SYSTOLIC BLOOD PRESSURE: 126 MMHG | DIASTOLIC BLOOD PRESSURE: 77 MMHG

## 2020-09-14 VITALS — SYSTOLIC BLOOD PRESSURE: 140 MMHG | DIASTOLIC BLOOD PRESSURE: 97 MMHG

## 2020-09-14 VITALS — DIASTOLIC BLOOD PRESSURE: 97 MMHG | SYSTOLIC BLOOD PRESSURE: 140 MMHG

## 2020-09-14 VITALS — SYSTOLIC BLOOD PRESSURE: 95 MMHG | DIASTOLIC BLOOD PRESSURE: 64 MMHG

## 2020-09-14 LAB
ANION GAP SERPL CALC-SCNC: 8.6 MMOL/L (ref 8–16)
BASOPHILS # BLD AUTO: 0 10*3/UL (ref 0–0.1)
BASOPHILS NFR BLD AUTO: 0.2 % (ref 0–1)
BUN SERPL-MCNC: 12 MG/DL (ref 7–26)
BUN/CREAT SERPL: 12 (ref 6–25)
CALCIUM SERPL-MCNC: 7.5 MG/DL (ref 8.4–10.2)
CHLORIDE SERPL-SCNC: 109 MMOL/L (ref 98–107)
CO2 SERPL-SCNC: 23 MMOL/L (ref 22–29)
DEPRECATED NEUTROPHILS # BLD AUTO: 5.4 10*3/UL (ref 2.1–6.9)
EGFRCR SERPLBLD CKD-EPI 2021: > 60 ML/MIN (ref 60–?)
EOSINOPHIL # BLD AUTO: 0 10*3/UL (ref 0–0.4)
EOSINOPHIL NFR BLD AUTO: 0 % (ref 0–6)
ERYTHROCYTE [DISTWIDTH] IN CORD BLOOD: 13.3 % (ref 11.7–14.4)
GLUCOSE SERPLBLD-MCNC: 98 MG/DL (ref 74–118)
HCT VFR BLD AUTO: 37.6 % (ref 38.2–49.6)
HEMOCCULT STL QL: POSITIVE
HGB BLD-MCNC: 12.6 G/DL (ref 14–18)
LYMPHOCYTES # BLD: 0.3 10*3/UL (ref 1–3.2)
LYMPHOCYTES NFR BLD AUTO: 5.2 % (ref 18–39.1)
LYMPHOCYTES NFR BLD MANUAL: 5 % (ref 19–48)
MCH RBC QN AUTO: 29.8 PG (ref 28–32)
MCHC RBC AUTO-ENTMCNC: 33.5 G/DL (ref 31–35)
MCV RBC AUTO: 88.9 FL (ref 81–99)
MONOCYTES # BLD AUTO: 0.3 10*3/UL (ref 0.2–0.8)
MONOCYTES NFR BLD AUTO: 4.2 % (ref 4.4–11.3)
MONOCYTES NFR BLD MANUAL: 1 % (ref 3.4–9)
NEUTS BAND NFR BLD MANUAL: 22 %
NEUTS SEG NFR BLD AUTO: 89.9 % (ref 38.7–80)
NEUTS SEG NFR BLD MANUAL: 69 % (ref 40–74)
PLAT MORPH BLD: (no result)
PLATELET # BLD AUTO: 59 X10E3/UL (ref 140–360)
PLATELET # BLD EST: (no result) 10*3/UL
POTASSIUM SERPL-SCNC: 3.6 MMOL/L (ref 3.5–5.1)
RBC # BLD AUTO: 4.23 X10E6/UL (ref 4.3–5.7)
RBC MORPH BLD: NORMAL
SODIUM SERPL-SCNC: 137 MMOL/L (ref 136–145)

## 2020-09-14 RX ADMIN — TAZOBACTAM SODIUM AND PIPERACILLIN SODIUM SCH MLS/HR: 375; 3 INJECTION, SOLUTION INTRAVENOUS at 11:57

## 2020-09-14 RX ADMIN — Medication PRN MG: at 20:23

## 2020-09-14 RX ADMIN — Medication PRN MG: at 07:12

## 2020-09-14 RX ADMIN — TAZOBACTAM SODIUM AND PIPERACILLIN SODIUM SCH MLS/HR: 375; 3 INJECTION, SOLUTION INTRAVENOUS at 17:32

## 2020-09-14 RX ADMIN — TAZOBACTAM SODIUM AND PIPERACILLIN SODIUM SCH MLS/HR: 375; 3 INJECTION, SOLUTION INTRAVENOUS at 05:02

## 2020-09-14 RX ADMIN — TAMSULOSIN HYDROCHLORIDE SCH MG: 0.4 CAPSULE ORAL at 09:01

## 2020-09-14 RX ADMIN — SODIUM CHLORIDE SCH MLS/HR: 9 INJECTION, SOLUTION INTRAVENOUS at 11:57

## 2020-09-14 RX ADMIN — SODIUM CHLORIDE SCH MLS/HR: 9 INJECTION, SOLUTION INTRAVENOUS at 20:23

## 2020-09-14 RX ADMIN — TAZOBACTAM SODIUM AND PIPERACILLIN SODIUM SCH MLS/HR: 375; 3 INJECTION, SOLUTION INTRAVENOUS at 22:26

## 2020-09-14 RX ADMIN — Medication PRN MG: at 21:25

## 2020-09-14 RX ADMIN — SODIUM CHLORIDE SCH MLS/HR: 9 INJECTION, SOLUTION INTRAVENOUS at 22:33

## 2020-09-14 RX ADMIN — SODIUM CHLORIDE SCH MLS/HR: 9 INJECTION, SOLUTION INTRAVENOUS at 03:07

## 2020-09-14 NOTE — HISTORY AND PHYSICAL
CHIEF COMPLAINT:  Mr. Jhonny Hughes is a pleasant 59-year-old hill, who

presented to the emergency room on the evening of the  with complaints of fevers and

chills. 

 

HISTORY OF PRESENT ILLNESS:  The patient reports he had been given antibiotic tablets to

take after a prostate needle biopsy done on Friday the , but despite this,

he developed rigor, fever on the evening of the  and presented to the emergency

room. 

 

PAST MEDICAL HISTORY:  Significant for hypertension.  He has had previous needle biopsy

done about six months ago, which apparently the biopsy was indeterminate. 

 

CURRENT MEDICATIONS:  At home include losartan 50 mg daily and atenolol daily.

 

ALLERGIES:  HE REPORTS THAT HE IS INTOLERANT OF CODEINE, CAUSES NAUSEA AND VOMITING.

 

FAMILY HISTORY:  His father  of diabetic complications.  He tells me he never knew

his mother.  Grandmother  of lung disease, being a heavy smoker. 

 

PHYSICAL EXAMINATION:

GENERAL:  At this time shows a pleasant, alert  man, who is comfortable. 

VITAL SIGNS:  Presenting temperature in the emergency room was 105.  Today's temperature

is 101.  Overnight, his blood pressure was as low as 94/69. 

HEAD, EYES, EARS, NOSE, AND THROAT:  Unremarkable. 

NECK:  No jugular venous distention. 

THORAX:  Heart sounds S1, S2 are equal.  No murmurs. 

LUNGS:  Clear. 

ABDOMEN:  Protuberant. 

EXTREMITIES:  No cyanosis, clubbing, or edema.

IMAGING DATA:  There is no EKG on the chart.

 

LABORATORY DATA:  Pertinent laboratory studies show a low platelet count at 96,000 and

72,000.  BUN, creatinine, and glucoses are unremarkable.  Potassium this morning is 3.1.

 Corona virus is negative.  Urinalysis shows red blood cells. 

 

ASSESSMENT:  

1. Urosepsis.

2. Recent prostate biopsy with results pending.

3. History of hypertension.

 

PLAN:  We will continue fluids and antibiotics.  We will withhold his blood pressure

medications for now.  Further management based on clinical course. 

 

 

 

 

______________________________

MD GRACIE Arroyo/SASHA

D:  2020 12:47:06

T:  2020 14:12:56

Job #:  [id number]

 

cc:            Reed Mendiola MD

## 2020-09-14 NOTE — NUR
Notified Dr Marie regarding poor urine output this shift: 200ML with NS at 125. Bladder 
scanned at 30ml. Continue to monitor. No new orders.

## 2020-09-14 NOTE — CONSULTATION
DATE OF CONSULTATION:  09/14/2020  

 

__________

 

REASON FOR CONSULTATION:  Status post prostate biopsy with sepsis.

 

HISTORY:  A 59-year-old male, who was originally seen in the office in 2015 with an

elevated PSA.  At that time, his PSA was 5.2; gave him antibiotics, his PSA went down to

3.2, was told he needed to have a PSA every year, was lost two followup until 2020.  In

April of 2020 with a PSA of 11.6.  The patient underwent biopsy; the biopsy at that

time, showed suspicious adenocarcinoma of the prostate and the base of the prostate,

left side lateral.  The patient was told that he needed to have a followup biopsy in 6

months.  He returns now for that reason, and on 09/11/2020, he underwent repeat biopsy

in the office.  Within 24 hours, I was called by the wife to tell me the patient had a

fever of 101.  I told the wife to bring the patient into the hospital for a CBC and CMP.

 Check his blood pressure and if he had any signs of being septic that he will be

admitted for IV antibiotics.  I was called when the patient came into the hospital.  At

that time, he had taken 2 Tylenols, his fever was down, but his pulse was 130 and with a

history of having had a transrectal biopsy of the prostate.  I told the emergency room

physician that the patient needed to be admitted to the hospital.  He was admitted to

the hospital and is receiving IV antibiotics.  By history, the patient has a history of

high blood pressure.  He takes atenolol 25 mg and losartan potassium 50 mg.  Past

urological history includes a cyst of the kidney, history of prostatitis.  __________ 

 

PAST MEDICAL HISTORY:  Includes gastroesophageal reflux, hypertension, and history of

asthma. 

 

SOCIAL HISTORY:  He is , sexually active, and has had four children.

 

PHYSICAL EXAMINATION:

Urological examination shows a prostate that is approximately 50 g transrectally on

rectal exam with the left lobe being firm.  He also has a left inguinal hernia, which is

most likely a lipoma. 

IMPRESSION:  Sepsis, status post transrectal biopsy of the prostate for elevated PSA and

suspicious adenocarcinoma on previous biopsy. 

 

RECOMMENDATION:  Continue IV antibiotics.  Usually this will be a gram-negative

infection, which will respond to any penicillin-based antibiotic.  He is receiving Zosyn

at the present time.  I discussed with the patient that he will require 3 to 5 days of

IV antibiotics.  After that, we will know the results of the culture and he will be on

oral antibiotics between 2 and 4 weeks.  Of course, the path report will be available

this week and I will communicate that with the patient. 

 

 

 

 

______________________________

MD SIRIA PimentelG/MODL

D:  09/14/2020 09:11:52

T:  09/14/2020 15:29:56

Job #:  [id number]

 

cc:            Jack Marie MD

## 2020-09-14 NOTE — NUR
CALLED TO NOTIFY DR LIN OF PT FEVER, DIARRHEA AND THAT STOOLS HAVE TURNED BLACK NOW. 
LEFT [facility] WITH ANSWERING SERVICE, AWAITING CALL BACK.

## 2020-09-14 NOTE — NUR
Bedside report and walking rounds completed with oncoming nurse. Patient in bed with call 
light within reach. No issues or concerns noted.

## 2020-09-15 VITALS — DIASTOLIC BLOOD PRESSURE: 92 MMHG | SYSTOLIC BLOOD PRESSURE: 172 MMHG

## 2020-09-15 VITALS — SYSTOLIC BLOOD PRESSURE: 136 MMHG | DIASTOLIC BLOOD PRESSURE: 82 MMHG

## 2020-09-15 VITALS — SYSTOLIC BLOOD PRESSURE: 150 MMHG | DIASTOLIC BLOOD PRESSURE: 94 MMHG

## 2020-09-15 VITALS — SYSTOLIC BLOOD PRESSURE: 140 MMHG | DIASTOLIC BLOOD PRESSURE: 89 MMHG

## 2020-09-15 VITALS — SYSTOLIC BLOOD PRESSURE: 135 MMHG | DIASTOLIC BLOOD PRESSURE: 97 MMHG

## 2020-09-15 VITALS — DIASTOLIC BLOOD PRESSURE: 92 MMHG | SYSTOLIC BLOOD PRESSURE: 148 MMHG

## 2020-09-15 VITALS — SYSTOLIC BLOOD PRESSURE: 148 MMHG | DIASTOLIC BLOOD PRESSURE: 92 MMHG

## 2020-09-15 VITALS — DIASTOLIC BLOOD PRESSURE: 94 MMHG | SYSTOLIC BLOOD PRESSURE: 150 MMHG

## 2020-09-15 LAB
ANION GAP SERPL CALC-SCNC: 12.2 MMOL/L (ref 8–16)
BASOPHILS # BLD AUTO: 0 10*3/UL (ref 0–0.1)
BASOPHILS NFR BLD AUTO: 0.2 % (ref 0–1)
BUN SERPL-MCNC: 8 MG/DL (ref 7–26)
BUN/CREAT SERPL: 11 (ref 6–25)
C DIFFICILE TOXIN A&B AMP PROB: NEGATIVE
CALCIUM SERPL-MCNC: 7.6 MG/DL (ref 8.4–10.2)
CHLORIDE SERPL-SCNC: 110 MMOL/L (ref 98–107)
CO2 SERPL-SCNC: 19 MMOL/L (ref 22–29)
DEPRECATED NEUTROPHILS # BLD AUTO: 3.7 10*3/UL (ref 2.1–6.9)
EGFRCR SERPLBLD CKD-EPI 2021: > 60 ML/MIN (ref 60–?)
EOSINOPHIL # BLD AUTO: 0 10*3/UL (ref 0–0.4)
EOSINOPHIL NFR BLD AUTO: 0.2 % (ref 0–6)
ERYTHROCYTE [DISTWIDTH] IN CORD BLOOD: 13.1 % (ref 11.7–14.4)
GLUCOSE SERPLBLD-MCNC: 82 MG/DL (ref 74–118)
HCT VFR BLD AUTO: 34.3 % (ref 38.2–49.6)
HGB BLD-MCNC: 11.6 G/DL (ref 14–18)
LYMPHOCYTES # BLD: 0.5 10*3/UL (ref 1–3.2)
LYMPHOCYTES NFR BLD AUTO: 10.6 % (ref 18–39.1)
MCH RBC QN AUTO: 29.5 PG (ref 28–32)
MCHC RBC AUTO-ENTMCNC: 33.8 G/DL (ref 31–35)
MCV RBC AUTO: 87.3 FL (ref 81–99)
MONOCYTES # BLD AUTO: 0.2 10*3/UL (ref 0.2–0.8)
MONOCYTES NFR BLD AUTO: 4.7 % (ref 4.4–11.3)
NEUTS SEG NFR BLD AUTO: 83.8 % (ref 38.7–80)
PLATELET # BLD AUTO: 48 X10E3/UL (ref 140–360)
POTASSIUM SERPL-SCNC: 3.2 MMOL/L (ref 3.5–5.1)
RBC # BLD AUTO: 3.93 X10E6/UL (ref 4.3–5.7)
SODIUM SERPL-SCNC: 138 MMOL/L (ref 136–145)

## 2020-09-15 RX ADMIN — SODIUM CHLORIDE SCH MLS/HR: 9 INJECTION, SOLUTION INTRAVENOUS at 11:39

## 2020-09-15 RX ADMIN — POTASSIUM CHLORIDE SCH MEQ: 1500 TABLET, EXTENDED RELEASE ORAL at 11:38

## 2020-09-15 RX ADMIN — TAMSULOSIN HYDROCHLORIDE SCH MG: 0.4 CAPSULE ORAL at 08:02

## 2020-09-15 RX ADMIN — Medication PRN MG: at 11:39

## 2020-09-15 RX ADMIN — SODIUM CHLORIDE SCH MLS/HR: 9 INJECTION, SOLUTION INTRAVENOUS at 21:36

## 2020-09-15 RX ADMIN — Medication PRN MG: at 16:24

## 2020-09-15 RX ADMIN — TAZOBACTAM SODIUM AND PIPERACILLIN SODIUM SCH MLS/HR: 375; 3 INJECTION, SOLUTION INTRAVENOUS at 04:53

## 2020-09-15 RX ADMIN — MEROPENEM SCH MLS/HR: 1 INJECTION INTRAVENOUS at 21:37

## 2020-09-15 RX ADMIN — Medication PRN MG: at 21:30

## 2020-09-15 RX ADMIN — TAZOBACTAM SODIUM AND PIPERACILLIN SODIUM SCH MLS/HR: 375; 3 INJECTION, SOLUTION INTRAVENOUS at 11:39

## 2020-09-15 NOTE — NUR
CALLED TO NOTIFY DR LIN PT PLT NOW 48, STOOL POS FOR OCCULT, AND LAB STATES UNLESS CDIFF 
IS ORDERED STAT IT WON'T BE RUN UNTIL THIS AFTERNOON  WITH BATCH. AWAITING CALL BACK.

## 2020-09-15 NOTE — NUR
SPOKE WITH DR LIN ABOUT RESULTS AND STATES HE WILL WAIT FOR CDIFF RESULTS AND TO CONT TO 
MONITOR PT FOR CHANGES.

## 2020-09-15 NOTE — NUR
infectious disease consultation



Chief complaint fever and chills



This patient is a very pleasant 59-year-old  male THE PATIENT TO 
HAVE HISTORY OF ELEVATED psa UNDERWENT PROSTATE BIOPSY HE WENT HOME DID WELL 
COMES WITH FEVER AND CHILLS HERE HE HAD e. COLI BACTEREMIA AS WELL uti THE e. 
COLI WHICH WAS MULTIDRUG RESISTANCE/esbl i WAS ASKED TO SEE THE PATIENT WAS 
ADMITTED FROM [facility] OFFICE.







A 59-year-old male, who was originally seen in the office in 2015 with an

elevated PSA.  At that time, his PSA was 5.2; gave him antibiotics, his PSA 
went down to

3.2, was told he needed to have a PSA every year, was lost two followup until 
2020.  In

April of 2020 with a PSA of 11.6.  The patient underwent biopsy; the biopsy at 
that

time, showed suspicious adenocarcinoma of the prostate and the base of the 
prostate,

left side lateral.  The patient was told that he needed to have a followup 
biopsy in 6

months.  He returns now for that reason, and on 09/11/2020, he underwent repeat 
biopsy

in the office.  Within 24 hours, I was called by the wife to tell me the 
patient had a

fever of 101.  I told the wife to bring the patient into the hospital for a CBC 
and CMP.

 Check his blood pressure and if he had any signs of being septic that he will 
be

admitted for IV antibiotics.  I was called when the patient came into the 
hospital.  At

that time, he had taken 2 Tylenols, his fever was down, but his pulse was 130 
and with a

history of having had a transrectal biopsy of the prostate.  I told the 
emergency room

physician that the patient needed to be admitted to the hospital.  He was 
admitted to

the hospital and is receiving IV antibiotics.  By history, the patient has a 
history of

high blood pressure.  He takes atenolol 25 mg and losartan potassium 50 mg.  
Past

urological history includes a cyst of the kidney, history of prostatitis.  _

patient has been on Zosyn is doing well cultures seen is currently has no 
complaints review of systems at present time all negative.



I answered all his questions.



PAST MEDICAL HISTORY:  Includes gastroesophageal reflux, hypertension, and 
history of

asthma. 

 

SOCIAL HISTORY:  He is , sexually active, and has had four children.

 

PHYSICAL EXAMINATION:

patient is currently alert oriented and does not seem to be in acute distress 
his vitals stable he did have fever earlier.



His HEENT normocephalic he is not pale and not icteric.



Neck supple no JVD.



Chest clear bilateral 



Heart S1-S2 no S3-S4.



Abdomen soft also present extremities no edema skin there is no rash





Per urology

Urological examination shows a prostate that is approximately 50 g 
transrectally on

rectal exam with the left lobe being firm.  He also has a left inguinal hernia, 
which is

most likely a lipoma. 







IMPRESSION: 



 Sepsis,present admission bacteremia E. coli ESBL UTI E. coli ESBL

We need to weeks of intravenous antibiotic meropenem 1 g IV piggyback every 8 
hours recheck CBC rechecking panel will get a PICC line

 status post transrectal biopsy of the prostate for elevated PSA and

suspicious adenocarcinoma on previous biopsy. 

 

Continue to monitor his CBC chemistry panel answered all his questions will 
check lab in the morning will follow

## 2020-09-16 VITALS — SYSTOLIC BLOOD PRESSURE: 134 MMHG | DIASTOLIC BLOOD PRESSURE: 88 MMHG

## 2020-09-16 VITALS — SYSTOLIC BLOOD PRESSURE: 152 MMHG | DIASTOLIC BLOOD PRESSURE: 89 MMHG

## 2020-09-16 VITALS — SYSTOLIC BLOOD PRESSURE: 153 MMHG | DIASTOLIC BLOOD PRESSURE: 91 MMHG

## 2020-09-16 VITALS — DIASTOLIC BLOOD PRESSURE: 82 MMHG | SYSTOLIC BLOOD PRESSURE: 149 MMHG

## 2020-09-16 VITALS — SYSTOLIC BLOOD PRESSURE: 143 MMHG | DIASTOLIC BLOOD PRESSURE: 80 MMHG

## 2020-09-16 VITALS — DIASTOLIC BLOOD PRESSURE: 90 MMHG | SYSTOLIC BLOOD PRESSURE: 141 MMHG

## 2020-09-16 LAB
ANION GAP SERPL CALC-SCNC: 13.9 MMOL/L (ref 8–16)
BASOPHILS # BLD AUTO: 0 10*3/UL (ref 0–0.1)
BASOPHILS NFR BLD AUTO: 0.2 % (ref 0–1)
BUN SERPL-MCNC: 8 MG/DL (ref 7–26)
BUN/CREAT SERPL: 12 (ref 6–25)
CALCIUM SERPL-MCNC: 7.4 MG/DL (ref 8.4–10.2)
CHLORIDE SERPL-SCNC: 109 MMOL/L (ref 98–107)
CO2 SERPL-SCNC: 19 MMOL/L (ref 22–29)
DEPRECATED NEUTROPHILS # BLD AUTO: 4 10*3/UL (ref 2.1–6.9)
EGFRCR SERPLBLD CKD-EPI 2021: > 60 ML/MIN (ref 60–?)
EOSINOPHIL # BLD AUTO: 0 10*3/UL (ref 0–0.4)
EOSINOPHIL NFR BLD AUTO: 0.4 % (ref 0–6)
ERYTHROCYTE [DISTWIDTH] IN CORD BLOOD: 13.2 % (ref 11.7–14.4)
GLUCOSE SERPLBLD-MCNC: 71 MG/DL (ref 74–118)
HCT VFR BLD AUTO: 33.4 % (ref 38.2–49.6)
HGB BLD-MCNC: 11.3 G/DL (ref 14–18)
LYMPHOCYTES # BLD: 0.6 10*3/UL (ref 1–3.2)
LYMPHOCYTES NFR BLD AUTO: 12 % (ref 18–39.1)
MCH RBC QN AUTO: 29.4 PG (ref 28–32)
MCHC RBC AUTO-ENTMCNC: 33.8 G/DL (ref 31–35)
MCV RBC AUTO: 87 FL (ref 81–99)
MONOCYTES # BLD AUTO: 0.6 10*3/UL (ref 0.2–0.8)
MONOCYTES NFR BLD AUTO: 10.8 % (ref 4.4–11.3)
NEUTS SEG NFR BLD AUTO: 76.2 % (ref 38.7–80)
PLATELET # BLD AUTO: 64 X10E3/UL (ref 140–360)
POTASSIUM SERPL-SCNC: 2.9 MMOL/L (ref 3.5–5.1)
RBC # BLD AUTO: 3.84 X10E6/UL (ref 4.3–5.7)
SODIUM SERPL-SCNC: 139 MMOL/L (ref 136–145)

## 2020-09-16 RX ADMIN — Medication PRN MG: at 17:34

## 2020-09-16 RX ADMIN — TAMSULOSIN HYDROCHLORIDE SCH MG: 0.4 CAPSULE ORAL at 09:53

## 2020-09-16 RX ADMIN — SODIUM CHLORIDE SCH MLS/HR: 9 INJECTION, SOLUTION INTRAVENOUS at 04:18

## 2020-09-16 RX ADMIN — Medication PRN MG: at 12:04

## 2020-09-16 RX ADMIN — MEROPENEM SCH MLS/HR: 1 INJECTION INTRAVENOUS at 13:50

## 2020-09-16 RX ADMIN — LOSARTAN POTASSIUM SCH MG: 25 TABLET, FILM COATED ORAL at 09:53

## 2020-09-16 RX ADMIN — MEROPENEM SCH MLS/HR: 1 INJECTION INTRAVENOUS at 22:24

## 2020-09-16 RX ADMIN — POTASSIUM CHLORIDE SCH MEQ: 1500 TABLET, EXTENDED RELEASE ORAL at 09:53

## 2020-09-16 RX ADMIN — SODIUM CHLORIDE SCH MLS/HR: 9 INJECTION, SOLUTION INTRAVENOUS at 12:00

## 2020-09-16 RX ADMIN — ATENOLOL SCH MG: 50 TABLET ORAL at 09:54

## 2020-09-16 RX ADMIN — Medication PRN MG: at 04:21

## 2020-09-16 RX ADMIN — SODIUM CHLORIDE SCH MLS/HR: 9 INJECTION, SOLUTION INTRAVENOUS at 13:50

## 2020-09-16 RX ADMIN — MEROPENEM SCH MLS/HR: 1 INJECTION INTRAVENOUS at 05:20

## 2020-09-16 RX ADMIN — SODIUM CHLORIDE SCH MG: 900 INJECTION INTRAVENOUS at 09:52

## 2020-09-16 NOTE — PROGRESS NOTE
DATE:    

 

SUBJECTIVE:  Today, the patient states that he feels better.

 

OBJECTIVE:  VITAL SIGNS:  Pulse is much better.  Temperature is still 99.3 and 99.0.

 

LABORATORY DATA:  This morning his lab work are as follow.  WBC 5.2, hemoglobin of 11.3,

hematocrit of 33.4, and his platelet count is 64, which is low.  The potassium is 2.9.

I will go ahead and order oral potassium.  His creatinine is 0.6, BUN of 8, which is

completely normal.  Calcium was 7.4.  The patient also states that he has not had any

more diarrhea or blood in the stool.  He also states that his urine now is grossly

clear.  He is receiving antibiotics.  He got a PICC line.  He has been seen by Dr. Ying.  Recommendation from urological standpoint, on tamsulosin, he is urinating under

the care of Dr. Ying for his bacteremia.  I will go ahead and order some potassium for

him. 

 

 

 

 

______________________________

MD NAINA Pimentel/SASHA

D:  09/16/2020 08:34:22

T:  09/16/2020 08:53:31

Job #:  [id number]

## 2020-09-16 NOTE — DIAGNOSTIC IMAGING REPORT
EXAMINATION:  CHEST XRAY LINE PLACEMENT    



INDICATION:      

^line placement



COMPARISON:  None

     

FINDINGS:    



Right PICC tip projects over the distal SVC.



Heart is normal in size. Pulmonary vasculature is normal.



Bandlike opacities in the left lower lobe likely represent subsegmental

atelectasis. Right lung is clear. No pleural effusion. No pneumothorax.



IMPRESSION: 



Right PICC tip projects over the distal SVC. No pneumothorax.





Signed by: Mayco Pimentel MD on 9/16/2020 1:05 AM

## 2020-09-16 NOTE — PROGRESS NOTE
DATE:    

 

SUBJECTIVE:  Mr. Hughes is feeling better.  There is no new complaint.  I answered

all his questions today. 

 

REVIEW OF SYSTEMS:

Otherwise; 

HEENT:  Negative. 

PULMONARY:  Negative. 

CARDIAC:  Negative.

 

PHYSICAL EXAMINATION:

GENERAL:  He is currently alert, oriented. 

VITAL SIGNS:  Stable, currently afebrile. 

HEENT:  He is not icteric. 

NECK:  Supple. 

CHEST:  Clear bilateral. 

HEART:  S1 and S2. 

ABDOMEN:  Soft.  Bowel sounds present. 

EXTREMITIES:  No edema. 

SKIN:  No rash.

IMPRESSION:  Sepsis on admission, urinary tract infection, pyelonephritis, bacteremia

with 

multidrug resistant.  The patient will be discharged home with meropenem 1 g q.8, which

is arranged.  Discussed with case management to discharge home tomorrow.  We will

follow. 

 

 

 

 

______________________________

MD PING Tucker/SASHA

D:  09/16/2020 17:45:34

T:  09/16/2020 18:51:57

Job #:  [id number]

## 2020-09-17 VITALS — SYSTOLIC BLOOD PRESSURE: 122 MMHG | DIASTOLIC BLOOD PRESSURE: 72 MMHG

## 2020-09-17 VITALS — SYSTOLIC BLOOD PRESSURE: 163 MMHG | DIASTOLIC BLOOD PRESSURE: 86 MMHG

## 2020-09-17 VITALS — DIASTOLIC BLOOD PRESSURE: 92 MMHG | SYSTOLIC BLOOD PRESSURE: 160 MMHG

## 2020-09-17 VITALS — DIASTOLIC BLOOD PRESSURE: 84 MMHG | SYSTOLIC BLOOD PRESSURE: 153 MMHG

## 2020-09-17 VITALS — DIASTOLIC BLOOD PRESSURE: 72 MMHG | SYSTOLIC BLOOD PRESSURE: 122 MMHG

## 2020-09-17 LAB
ANION GAP SERPL CALC-SCNC: 11.9 MMOL/L (ref 8–16)
BUN SERPL-MCNC: 7 MG/DL (ref 7–26)
BUN/CREAT SERPL: 11 (ref 6–25)
CALCIUM SERPL-MCNC: 6.9 MG/DL (ref 8.4–10.2)
CHLORIDE SERPL-SCNC: 108 MMOL/L (ref 98–107)
CO2 SERPL-SCNC: 21 MMOL/L (ref 22–29)
EGFRCR SERPLBLD CKD-EPI 2021: > 60 ML/MIN (ref 60–?)
GLUCOSE SERPLBLD-MCNC: 120 MG/DL (ref 74–118)
POTASSIUM SERPL-SCNC: 2.9 MMOL/L (ref 3.5–5.1)
SODIUM SERPL-SCNC: 138 MMOL/L (ref 136–145)

## 2020-09-17 RX ADMIN — MEROPENEM SCH MLS/HR: 1 INJECTION INTRAVENOUS at 13:45

## 2020-09-17 RX ADMIN — TAMSULOSIN HYDROCHLORIDE SCH MG: 0.4 CAPSULE ORAL at 09:06

## 2020-09-17 RX ADMIN — LOSARTAN POTASSIUM SCH MG: 25 TABLET, FILM COATED ORAL at 09:06

## 2020-09-17 RX ADMIN — SODIUM CHLORIDE SCH MG: 900 INJECTION INTRAVENOUS at 10:07

## 2020-09-17 RX ADMIN — SODIUM CHLORIDE SCH MG: 900 INJECTION INTRAVENOUS at 09:00

## 2020-09-17 RX ADMIN — ATENOLOL SCH MG: 50 TABLET ORAL at 09:06

## 2020-09-17 RX ADMIN — MEROPENEM SCH MLS/HR: 1 INJECTION INTRAVENOUS at 06:30

## 2020-09-17 RX ADMIN — SODIUM CHLORIDE SCH MLS/HR: 9 INJECTION, SOLUTION INTRAVENOUS at 03:50

## 2020-09-17 RX ADMIN — POTASSIUM CHLORIDE SCH MEQ: 1500 TABLET, EXTENDED RELEASE ORAL at 09:06

## 2020-09-17 NOTE — NUR
OP IV ABX ARRANGED BY DR LANTIGUA'S OFFICE AS FOLLOWS

MERRUM 1GM IV Q 8 X 2 WEEKS

PICC LINE IN PLACE

APPOINTMENT FOR PT TO GO TO DR LANTIGUA'S OFFICE AT NOON ON FRIDAY 9/18

CONFIRMED APPT TIME WITH ANNAMARIE AT DR LANTIGUA'S OFFICE

GAVE PT A MAP TO DR LANTIGUA'S OFFICE, APPT TIME AND DATE AND MY CARD FOR QUESTIONS/CONCERNS

PT VOICES UNDERSTANDING OF INSTRUCTIONS

NURSE AWARE OF ABOVE

PLAN DC HOE TODAY AFTER 2PM ABX

## 2020-09-17 NOTE — DISCHARGE SUMMARY
Mr. Jhonny Hughes is a pleasant 59-year-old man, who presented to the emergency room

on the 12th with a complaint of fever and chills after prostate biopsy. 

 

HOSPITAL COURSE:  He was started on broad-spectrum antibiotics of piperacillin and

tazobactam.  He was noted to be thrombocytopenic.  His blood pressure medicines were

initially held.  Cultures were sent.  Dr. David Mendiola was consulted with his note that

the patient had a PSA of 11.6 before biopsy and biopsy pathology still pending. 

 

The patient continued to have fever and chills.  When cultures were finally available,

it was found it was an E coli with extended-spectrum beta-lactamases positive.  Dr. Ying was consulted, who placed the patient on meropenem and patient now has been

afebrile for 24 hours.  He had some guaiac-positive stools, but was placed on IV

pantoprazole and this resolved. 

 

He has a PICC line in place and his potassium has been repleted orally.  He is not

eating or drinking well, but with no diarrhea, no fever. 

 

He is discharged today to continue outpatient IV meropenem through his PICC line with

Dr. Ying.  He will add Flomax 0.4 mg daily, Prilosec 20 mg daily as his insurance will

not cover pantoprazole and potassium 20 mEq daily.  He will follow up with Dr. Reed Shipman on a regular basis, Dr. Ying and Dr. David Mendiola. 

 

DISCHARGE DIAGNOSES:  

1. Urosepsis with Escherichia coli extended-spectrum beta-lactamases.

2. Probable prostate cancer.

3. Hypokalemia.

4. Probable stress ulcer, previously described as gastritis.

 

 

 

 

______________________________

MD GRACIE Arroyo/SASHA

D:  09/17/2020 10:13:04

T:  09/17/2020 11:26:22

Job #:  [id number]

 

cc:            MD Reed Tucker MD

## 2020-09-17 NOTE — PROGRESS NOTE
DATE:  09/17/2020  

 

Today, the patient feels better.  Has not had any diarrhea today, had one small diarrhea

yesterday.  He has been afebrile for the last 24 hours.  He had a PICC line that will be

managed by Dr. Ying.  I discussed the case with Dr. Marie.  We both agree the

patient may go home today, be followed by Dr. Ying for IV antibiotics.  When I

received the pathology report, I will call the patient and tell him about his path

report on the prostate biopsy.  It is noted and I discussed with Dr. Marie that the

patient has a potassium of 2.9. 

 

 

 

 

______________________________

MD NAINA Pimentel/SASHA

D:  09/17/2020 15:47:58

T:  09/17/2020 17:27:14

Job #:  [id number]

## 2020-09-17 NOTE — NUR
INFECTIOUS DISEASE PROGRESS NOTE

LATOYA LANTIGUA M.D 



SUBJECTIVE:  Mr. Hughes is feeling better.  There is no new complaint.



REVIEW OF SYSTEMS:

+ debility +weakness

ALL 14 POINT ROS NEG UNLESS OTHERWISE NOTED 

 

PHYSICAL EXAMINATION:

GENERAL:  He is currently alert, oriented. 

VITAL SIGNS:  Stable, currently afebrile. 

HEENT:  He is not icteric. 

NECK:  Supple. 

CHEST:  Clear bilateral. 

HEART:  S1 and S2. 

ABDOMEN:  Soft.  Bowel sounds present. 

EXTREMITIES:  No edema. 

SKIN:  No rash.



LABS: reviewed 

RADIOLOGY: reviewed 



IMPRESSION:  

Sepsis on admission

urinary tract infection

pyelonephritis 

bacteremia with MDRO 



PLAN

Merrem IV x2-3 weeks arranged at the infusion center

Pt will come tomorrow at 12pm

Meds are being delivered in the morning 

Call [phone] for any questions. 



discharge today 9/17



Martha Spence MSN, APRN, AGACNP-BC

Latoya Lantigua M.D

## 2020-09-17 NOTE — NUR
Dr Marie paged regarding AM labs.  New orders for potassium 40 meq received.  Patient may 
discharge today per MD.

## 2020-09-17 NOTE — NUR
Patient discharged at this time.  AAOX4, resp even and unlabored, no s/s distress noted.  No 
c/o pain at this time.  Discharge paperwork discussed with patient and family member, 
prescriptions given.  No questions or concerns noted at this time.  PICC line clean, dry, 
and intact, dressing changed before departure.  Patient left via wheelchair to private auto.

## 2021-02-08 ENCOUNTER — HOSPITAL ENCOUNTER (OUTPATIENT)
Dept: HOSPITAL 88 - RAD | Age: 61
End: 2021-02-08
Attending: FAMILY MEDICINE
Payer: COMMERCIAL

## 2021-02-08 DIAGNOSIS — R50.9: Primary | ICD-10-CM

## 2021-02-08 DIAGNOSIS — R05: ICD-10-CM

## 2021-02-08 PROCEDURE — 71046 X-RAY EXAM CHEST 2 VIEWS: CPT
